# Patient Record
Sex: MALE | Race: WHITE | Employment: FULL TIME | URBAN - METROPOLITAN AREA
[De-identification: names, ages, dates, MRNs, and addresses within clinical notes are randomized per-mention and may not be internally consistent; named-entity substitution may affect disease eponyms.]

---

## 2017-04-01 LAB
CHOLESTEROL: 246 MG/DL
GLUCOSE BLD-MCNC: 100 MG/DL (ref 70–140)
HDLC SERPL-MCNC: 50 MG/DL
LDL CHOLESTEROL CALCULATED: 168 MG/DL
PATIENT FASTING?: YES
TRIGL SERPL-MCNC: 138 MG/DL

## 2017-04-04 ENCOUNTER — EMPLOYEE WELLNESS (OUTPATIENT)
Dept: OTHER | Age: 42
End: 2017-04-04

## 2018-03-16 ENCOUNTER — EMPLOYEE WELLNESS (OUTPATIENT)
Dept: OTHER | Age: 43
End: 2018-03-16

## 2018-03-16 LAB
CHOLESTEROL: 237 MG/DL
GLUCOSE BLD-MCNC: 85 MG/DL (ref 70–99)
HDLC SERPL-MCNC: 37 MG/DL
LDL CHOLESTEROL CALCULATED: 153 MG/DL
PATIENT FASTING?: YES
TRIGL SERPL-MCNC: 233 MG/DL

## 2018-03-20 VITALS — WEIGHT: 192 LBS

## 2018-04-16 VITALS — WEIGHT: 196 LBS

## 2019-03-13 ENCOUNTER — EMPLOYEE WELLNESS (OUTPATIENT)
Dept: OTHER | Age: 44
End: 2019-03-13

## 2019-03-13 LAB
CHOLESTEROL: 242 MG/DL
GLUCOSE BLD-MCNC: 80 MG/DL (ref 70–99)
HDLC SERPL-MCNC: 41 MG/DL
LDL CHOLESTEROL CALCULATED: 167 MG/DL
PATIENT FASTING?: YES
TRIGL SERPL-MCNC: 172 MG/DL

## 2019-03-20 VITALS — WEIGHT: 202 LBS

## 2019-09-24 ENCOUNTER — OFFICE VISIT (OUTPATIENT)
Dept: INTERNAL MEDICINE CLINIC | Age: 44
End: 2019-09-24
Payer: COMMERCIAL

## 2019-09-24 VITALS
HEART RATE: 83 BPM | HEIGHT: 70 IN | SYSTOLIC BLOOD PRESSURE: 141 MMHG | OXYGEN SATURATION: 96 % | DIASTOLIC BLOOD PRESSURE: 72 MMHG | BODY MASS INDEX: 30.01 KG/M2 | RESPIRATION RATE: 18 BRPM | WEIGHT: 209.6 LBS

## 2019-09-24 DIAGNOSIS — Z00.00 WELLNESS EXAMINATION: Primary | ICD-10-CM

## 2019-09-24 DIAGNOSIS — Z23 NEED FOR TDAP VACCINATION: ICD-10-CM

## 2019-09-24 DIAGNOSIS — Z00.00 WELLNESS EXAMINATION: ICD-10-CM

## 2019-09-24 DIAGNOSIS — J30.2 SEASONAL ALLERGIC RHINITIS, UNSPECIFIED TRIGGER: ICD-10-CM

## 2019-09-24 DIAGNOSIS — Z11.4 ENCOUNTER FOR SCREENING FOR HIV: ICD-10-CM

## 2019-09-24 DIAGNOSIS — R03.0 ELEVATED BLOOD PRESSURE READING: ICD-10-CM

## 2019-09-24 LAB
A/G RATIO: 1.8 (ref 1.1–2.2)
ALBUMIN SERPL-MCNC: 4.5 G/DL (ref 3.4–5)
ALP BLD-CCNC: 89 U/L (ref 40–129)
ALT SERPL-CCNC: 23 U/L (ref 10–40)
ANION GAP SERPL CALCULATED.3IONS-SCNC: 14 MMOL/L (ref 3–16)
AST SERPL-CCNC: 18 U/L (ref 15–37)
BASOPHILS ABSOLUTE: 0.1 K/UL (ref 0–0.2)
BASOPHILS RELATIVE PERCENT: 1.1 %
BILIRUB SERPL-MCNC: <0.2 MG/DL (ref 0–1)
BUN BLDV-MCNC: 12 MG/DL (ref 7–20)
CALCIUM SERPL-MCNC: 9.4 MG/DL (ref 8.3–10.6)
CHLORIDE BLD-SCNC: 106 MMOL/L (ref 99–110)
CHOLESTEROL, FASTING: 255 MG/DL (ref 0–199)
CO2: 23 MMOL/L (ref 21–32)
CREAT SERPL-MCNC: 0.7 MG/DL (ref 0.9–1.3)
EOSINOPHILS ABSOLUTE: 0.2 K/UL (ref 0–0.6)
EOSINOPHILS RELATIVE PERCENT: 3.9 %
GFR AFRICAN AMERICAN: >60
GFR NON-AFRICAN AMERICAN: >60
GLOBULIN: 2.5 G/DL
GLUCOSE BLD-MCNC: 90 MG/DL (ref 70–99)
HCT VFR BLD CALC: 45.1 % (ref 40.5–52.5)
HDLC SERPL-MCNC: 41 MG/DL (ref 40–60)
HEMOGLOBIN: 15.1 G/DL (ref 13.5–17.5)
LDL CHOLESTEROL CALCULATED: 170 MG/DL
LYMPHOCYTES ABSOLUTE: 1.8 K/UL (ref 1–5.1)
LYMPHOCYTES RELATIVE PERCENT: 31.9 %
MCH RBC QN AUTO: 31 PG (ref 26–34)
MCHC RBC AUTO-ENTMCNC: 33.5 G/DL (ref 31–36)
MCV RBC AUTO: 92.6 FL (ref 80–100)
MONOCYTES ABSOLUTE: 0.4 K/UL (ref 0–1.3)
MONOCYTES RELATIVE PERCENT: 7.6 %
NEUTROPHILS ABSOLUTE: 3.2 K/UL (ref 1.7–7.7)
NEUTROPHILS RELATIVE PERCENT: 55.5 %
PDW BLD-RTO: 13.4 % (ref 12.4–15.4)
PLATELET # BLD: 261 K/UL (ref 135–450)
PMV BLD AUTO: 8.5 FL (ref 5–10.5)
POTASSIUM SERPL-SCNC: 4.8 MMOL/L (ref 3.5–5.1)
RBC # BLD: 4.87 M/UL (ref 4.2–5.9)
SODIUM BLD-SCNC: 143 MMOL/L (ref 136–145)
TOTAL PROTEIN: 7 G/DL (ref 6.4–8.2)
TRIGLYCERIDE, FASTING: 221 MG/DL (ref 0–150)
TSH REFLEX: 2.53 UIU/ML (ref 0.27–4.2)
VLDLC SERPL CALC-MCNC: 44 MG/DL
WBC # BLD: 5.8 K/UL (ref 4–11)

## 2019-09-24 PROCEDURE — 90471 IMMUNIZATION ADMIN: CPT | Performed by: NURSE PRACTITIONER

## 2019-09-24 PROCEDURE — 90715 TDAP VACCINE 7 YRS/> IM: CPT | Performed by: NURSE PRACTITIONER

## 2019-09-24 PROCEDURE — 99396 PREV VISIT EST AGE 40-64: CPT | Performed by: NURSE PRACTITIONER

## 2019-09-24 RX ORDER — CETIRIZINE HYDROCHLORIDE 10 MG/1
10 TABLET ORAL DAILY
COMMUNITY

## 2019-09-24 ASSESSMENT — ENCOUNTER SYMPTOMS
SHORTNESS OF BREATH: 0
DIARRHEA: 0
WHEEZING: 0
ABDOMINAL DISTENTION: 0
ABDOMINAL PAIN: 0
SORE THROAT: 0
SINUS PRESSURE: 0
NAUSEA: 0
EYES NEGATIVE: 1
COLOR CHANGE: 0
CHEST TIGHTNESS: 0
COUGH: 0
SINUS PAIN: 0
CONSTIPATION: 0
ALLERGIC/IMMUNOLOGIC NEGATIVE: 1

## 2019-09-24 ASSESSMENT — PATIENT HEALTH QUESTIONNAIRE - PHQ9
SUM OF ALL RESPONSES TO PHQ QUESTIONS 1-9: 0
SUM OF ALL RESPONSES TO PHQ9 QUESTIONS 1 & 2: 0
2. FEELING DOWN, DEPRESSED OR HOPELESS: 0
1. LITTLE INTEREST OR PLEASURE IN DOING THINGS: 0
DEPRESSION UNABLE TO ASSESS: FUNCTIONAL CAPACITY MOTIVATION LIMITS ACCURACY
SUM OF ALL RESPONSES TO PHQ QUESTIONS 1-9: 0

## 2019-09-24 NOTE — PROGRESS NOTES
hematuria. Musculoskeletal: Negative for arthralgias, gait problem, joint swelling and myalgias. Skin: Negative for color change and rash. Allergic/Immunologic: Negative. Neurological: Negative for dizziness, light-headedness and numbness. Hematological: Negative. Psychiatric/Behavioral: Negative. Prior to Visit Medications    Medication Sig Taking? Authorizing Provider   cetirizine (ZYRTEC) 10 MG tablet Take 10 mg by mouth daily Yes Historical Provider, MD        No Known Allergies    History reviewed. No pertinent past medical history. History reviewed. No pertinent surgical history. Social History     Socioeconomic History    Marital status:      Spouse name: Not on file    Number of children: Not on file    Years of education: Not on file    Highest education level: Not on file   Occupational History    Not on file   Social Needs    Financial resource strain: Not on file    Food insecurity:     Worry: Not on file     Inability: Not on file    Transportation needs:     Medical: Not on file     Non-medical: Not on file   Tobacco Use    Smoking status: Former Smoker    Smokeless tobacco: Current User     Types: Snuff   Substance and Sexual Activity    Alcohol use:  Yes    Drug use: Never    Sexual activity: Never   Lifestyle    Physical activity:     Days per week: Not on file     Minutes per session: Not on file    Stress: Not on file   Relationships    Social connections:     Talks on phone: Not on file     Gets together: Not on file     Attends Religion service: Not on file     Active member of club or organization: Not on file     Attends meetings of clubs or organizations: Not on file     Relationship status: Not on file    Intimate partner violence:     Fear of current or ex partner: Not on file     Emotionally abused: Not on file     Physically abused: Not on file     Forced sexual activity: Not on file   Other Topics Concern    Not on file   Social History

## 2019-09-25 PROBLEM — E78.00 ELEVATED LDL CHOLESTEROL LEVEL: Status: ACTIVE | Noted: 2019-09-25

## 2019-09-25 LAB
HIV AG/AB: NORMAL
HIV ANTIGEN: NORMAL
HIV-1 ANTIBODY: NORMAL
HIV-2 AB: NORMAL

## 2019-10-15 ENCOUNTER — TELEPHONE (OUTPATIENT)
Dept: INTERNAL MEDICINE CLINIC | Age: 44
End: 2019-10-15

## 2020-03-26 ENCOUNTER — OFFICE VISIT (OUTPATIENT)
Dept: INTERNAL MEDICINE CLINIC | Age: 45
End: 2020-03-26
Payer: COMMERCIAL

## 2020-03-26 VITALS
SYSTOLIC BLOOD PRESSURE: 126 MMHG | OXYGEN SATURATION: 96 % | DIASTOLIC BLOOD PRESSURE: 86 MMHG | BODY MASS INDEX: 27.98 KG/M2 | WEIGHT: 195 LBS | RESPIRATION RATE: 18 BRPM | HEART RATE: 68 BPM

## 2020-03-26 LAB
A/G RATIO: 1.9 (ref 1.1–2.2)
ALBUMIN SERPL-MCNC: 4.7 G/DL (ref 3.4–5)
ALP BLD-CCNC: 89 U/L (ref 40–129)
ALT SERPL-CCNC: 28 U/L (ref 10–40)
ANION GAP SERPL CALCULATED.3IONS-SCNC: 12 MMOL/L (ref 3–16)
AST SERPL-CCNC: 25 U/L (ref 15–37)
BILIRUB SERPL-MCNC: <0.2 MG/DL (ref 0–1)
BUN BLDV-MCNC: 13 MG/DL (ref 7–20)
CALCIUM SERPL-MCNC: 9.8 MG/DL (ref 8.3–10.6)
CHLORIDE BLD-SCNC: 106 MMOL/L (ref 99–110)
CHOLESTEROL, FASTING: 257 MG/DL (ref 0–199)
CO2: 26 MMOL/L (ref 21–32)
CREAT SERPL-MCNC: 0.8 MG/DL (ref 0.9–1.3)
GFR AFRICAN AMERICAN: >60
GFR NON-AFRICAN AMERICAN: >60
GLOBULIN: 2.5 G/DL
GLUCOSE BLD-MCNC: 96 MG/DL (ref 70–99)
HDLC SERPL-MCNC: 46 MG/DL (ref 40–60)
LDL CHOLESTEROL CALCULATED: 172 MG/DL
POTASSIUM SERPL-SCNC: 5.3 MMOL/L (ref 3.5–5.1)
SODIUM BLD-SCNC: 144 MMOL/L (ref 136–145)
TOTAL PROTEIN: 7.2 G/DL (ref 6.4–8.2)
TRIGLYCERIDE, FASTING: 193 MG/DL (ref 0–150)
VLDLC SERPL CALC-MCNC: 39 MG/DL

## 2020-03-26 PROCEDURE — 99396 PREV VISIT EST AGE 40-64: CPT | Performed by: NURSE PRACTITIONER

## 2020-03-26 PROCEDURE — 36415 COLL VENOUS BLD VENIPUNCTURE: CPT | Performed by: NURSE PRACTITIONER

## 2020-03-26 ASSESSMENT — ENCOUNTER SYMPTOMS
SINUS PRESSURE: 0
ABDOMINAL DISTENTION: 0
SORE THROAT: 0
WHEEZING: 0
SHORTNESS OF BREATH: 0
EYES NEGATIVE: 1
CONSTIPATION: 0
COUGH: 0
CHEST TIGHTNESS: 0
COLOR CHANGE: 0
SINUS PAIN: 0
DIARRHEA: 0
NAUSEA: 0
ABDOMINAL PAIN: 0

## 2020-03-26 ASSESSMENT — PATIENT HEALTH QUESTIONNAIRE - PHQ9
SUM OF ALL RESPONSES TO PHQ QUESTIONS 1-9: 0
1. LITTLE INTEREST OR PLEASURE IN DOING THINGS: 0
2. FEELING DOWN, DEPRESSED OR HOPELESS: 0
SUM OF ALL RESPONSES TO PHQ9 QUESTIONS 1 & 2: 0
SUM OF ALL RESPONSES TO PHQ QUESTIONS 1-9: 0

## 2020-03-26 NOTE — PROGRESS NOTES
3/26/2020    Damaris Bustillo (:  1975) is a 40 y.o. male, here for a preventive medicine evaluation. Patient Active Problem List   Diagnosis    Seasonal allergic rhinitis    Elevated LDL cholesterol level     Overall, the patient denies any specific concerns for his health since his last 6 month follow up. His BP was moderately elevated last OV, however, he and his wife have been exercising routinely and working on diet improvements, limiting sodium intake. His wife is a nurse and has been checking his BP routinely and has been averaging around 120-130/80. Denies chest pain, dyspnea, edema, palpiations. His LDL was 170 6 months ago and we will need to recheck this today. He continues to use daily OTC Zyrtec with good control of his allergic rhinitis. Denies any recent exacerbations or concerns on current therapy. The 10-year ASCVD risk score (Aga Carter, et al., 2013) is: 3.5%    Values used to calculate the score:      Age: 40 years      Sex: Male      Is Non- : No      Diabetic: No      Tobacco smoker: No      Systolic Blood Pressure: 532 mmHg      Is BP treated: No      HDL Cholesterol: 41 mg/dL      Total Cholesterol: 255 mg/dL      Review of Systems   Constitutional: Negative for activity change, appetite change, fatigue and fever. HENT: Negative for congestion, sinus pressure, sinus pain and sore throat. Eyes: Negative. Respiratory: Negative for cough, chest tightness, shortness of breath and wheezing. Cardiovascular: Negative for chest pain and palpitations. Gastrointestinal: Negative for abdominal distention, abdominal pain, constipation, diarrhea and nausea. Genitourinary: Negative for difficulty urinating, dysuria, flank pain, frequency and hematuria. Musculoskeletal: Negative for arthralgias, gait problem, joint swelling and myalgias. Skin: Negative for color change and rash.    Neurological: Negative for dizziness, light-headedness and 195 lb (88.5 kg)     Estimated body mass index is 27.98 kg/m² as calculated from the following:    Height as of 9/24/19: 5' 10\" (1.778 m). Weight as of this encounter: 195 lb (88.5 kg). Physical Exam  Constitutional:       General: He is not in acute distress. Appearance: He is well-developed. He is not diaphoretic. HENT:      Head: Normocephalic and atraumatic. Nose: Nose normal.   Eyes:      Conjunctiva/sclera: Conjunctivae normal.      Pupils: Pupils are equal, round, and reactive to light. Neck:      Thyroid: No thyromegaly. Cardiovascular:      Rate and Rhythm: Normal rate and regular rhythm. Heart sounds: Normal heart sounds. No murmur. Pulmonary:      Effort: Pulmonary effort is normal.      Breath sounds: Normal breath sounds. Abdominal:      General: Bowel sounds are normal. There is no distension. Palpations: Abdomen is soft. Tenderness: There is no abdominal tenderness. Musculoskeletal: Normal range of motion. Lymphadenopathy:      Cervical: No cervical adenopathy. Skin:     General: Skin is warm and dry. Capillary Refill: Capillary refill takes less than 2 seconds. Findings: No rash. Neurological:      Mental Status: He is alert and oriented to person, place, and time. GCS: GCS eye subscore is 4. GCS verbal subscore is 5. GCS motor subscore is 6. Cranial Nerves: No cranial nerve deficit. Sensory: No sensory deficit. Coordination: Coordination normal.      Deep Tendon Reflexes: Reflexes normal.   Psychiatric:         Behavior: Behavior normal.         Thought Content: Thought content normal.         No flowsheet data found.     Lab Results   Component Value Date    CHOL 242 03/13/2019    CHOL 237 03/16/2018    CHOL 246 04/01/2017    CHOLFAST 255 09/24/2019    TRIG 172 03/13/2019    TRIG 233 03/16/2018    TRIG 138 04/01/2017    TRIGLYCFAST 221 09/24/2019    HDL 41 09/24/2019    HDL 41 03/13/2019    HDL 37 03/16/2018    LDLCALC 170 09/24/2019    LDLCALC 167 03/13/2019    [address] 153 03/16/2018    GLUCOSE 90 09/24/2019       The 10-year ASCVD risk score (Talha Leo., et al., 2013) is: 3.5%    Values used to calculate the score:      Age: 40 years      Sex: Male      Is Non- : No      Diabetic: No      Tobacco smoker: No      Systolic Blood Pressure: 643 mmHg      Is BP treated: No      HDL Cholesterol: 41 mg/dL      Total Cholesterol: 255 mg/dL    Immunization History   Administered Date(s) Administered    Tdap (Boostrix, Adacel) 09/24/2019       Health Maintenance   Topic Date Due    Lipid screen  09/24/2024    DTaP/Tdap/Td vaccine (2 - Td) 09/24/2029    Flu vaccine  Completed    HIV screen  Completed    Hepatitis A vaccine  Aged Out    Hepatitis B vaccine  Aged Out    Hib vaccine  Aged Out    Meningococcal (ACWY) vaccine  Aged Out    Pneumococcal 0-64 years Vaccine  Aged Out       ASSESSMENT/PLAN:  1. Encounter for preventive health examination- no acute concerns on exam today; BP is much improved. He has occasional elevated readings at home, but his average is at goal. Continue low sodium diet and routine exercise; monitor periodically at home and let us know if any issues. - Lipid, Fasting; Future  - Comprehensive Metabolic Panel; Future    2. Elevated LDL cholesterol level- recheck lipid panel today; if not improving, may need to consider statin. - Lipid, Fasting; Future  - Comprehensive Metabolic Panel; Future    3. Seasonal allergic rhinitis, unspecified trigger- stable; continue Zyrtec. No changes. Course of treatment, including any medications, possible imaging, referrals, and follow ups discussed with patient. All risks and benefits and possible side effects discussed with patient who agrees to plan of care and verbalizes understanding. All labs and imaging reviewed. Return in about 1 year (around 3/26/2021). An electronic signature was used to authenticate this note.     --Tawnya Bettencourt,

## 2020-03-27 RX ORDER — PRAVASTATIN SODIUM 20 MG
20 TABLET ORAL DAILY
Qty: 30 TABLET | Refills: 2 | Status: SHIPPED | OUTPATIENT
Start: 2020-03-27 | End: 2020-05-18

## 2020-05-18 RX ORDER — PRAVASTATIN SODIUM 20 MG
20 TABLET ORAL DAILY
Qty: 30 TABLET | Refills: 2 | Status: SHIPPED | OUTPATIENT
Start: 2020-05-18 | End: 2020-06-22 | Stop reason: SDUPTHER

## 2020-06-22 RX ORDER — PRAVASTATIN SODIUM 20 MG
20 TABLET ORAL DAILY
Qty: 30 TABLET | Refills: 2 | Status: SHIPPED | OUTPATIENT
Start: 2020-06-22 | End: 2020-09-04

## 2020-06-26 LAB
CHOLESTEROL, FASTING: 220 MG/DL (ref 0–199)
HDLC SERPL-MCNC: 49 MG/DL (ref 40–60)
LDL CHOLESTEROL CALCULATED: 144 MG/DL
TRIGLYCERIDE, FASTING: 134 MG/DL (ref 0–150)
VLDLC SERPL CALC-MCNC: 27 MG/DL

## 2020-06-26 PROCEDURE — 36415 COLL VENOUS BLD VENIPUNCTURE: CPT | Performed by: NURSE PRACTITIONER

## 2021-01-04 ENCOUNTER — HOSPITAL ENCOUNTER (OUTPATIENT)
Age: 46
Setting detail: SPECIMEN
Discharge: HOME | End: 2021-01-04
Payer: COMMERCIAL

## 2021-01-04 ENCOUNTER — OFFICE VISIT (OUTPATIENT)
Dept: PRIMARY CARE CLINIC | Age: 46
End: 2021-01-04
Payer: COMMERCIAL

## 2021-01-04 VITALS — OXYGEN SATURATION: 94 % | TEMPERATURE: 97.3 F | HEART RATE: 91 BPM

## 2021-01-04 DIAGNOSIS — Z20.822 CLOSE EXPOSURE TO COVID-19 VIRUS: ICD-10-CM

## 2021-01-04 DIAGNOSIS — R05.9 COUGH: Primary | ICD-10-CM

## 2021-01-04 DIAGNOSIS — M79.10 MUSCLE ACHE: ICD-10-CM

## 2021-01-04 PROCEDURE — 99213 OFFICE O/P EST LOW 20 MIN: CPT | Performed by: NURSE PRACTITIONER

## 2021-01-04 PROCEDURE — U0002 COVID-19 LAB TEST NON-CDC: HCPCS

## 2021-01-04 NOTE — PROGRESS NOTES
1/4/21  Og Alfred  1975    FLU/COVID-19 CLINIC EVALUATION    HPI SYMPTOMS: Pt's daughter/family (+) Covid-19. Employer: [employer]    [] Fevers  [x] Chills  [x] Cough  [] Coughing up blood  [] Chest Congestion  [x] Nasal Congestion--HEAD CONGESTION  [] Feeling short of breath  [] Sometimes  [] Frequently  [] All the time  [] Chest pain  [] Headaches  []Tolerable  [] Severe  [] Sore throat  [x] Muscle aches-- ACHY JOINTS  [] Nausea  [] Vomiting  []Unable to keep fluids down  [] Diarrhea  []Severe    [] OTHER SYMPTOMS:      Symptom Duration:   [] 1  [] 2   [x] 3   [] 4    [] 5   [] 6   [] 7   [] 8   [] 9   [] 10   [] 11   [] 12   [] 13   [] 14   [] Longer than 14 days    Symptom course:   [] Worsening     [x] Stable     [] Improving    RISK FACTORS:    [] Pregnant or possibly pregnant  [] Age over 61  [] Diabetes  [] Heart disease  [] Asthma  [] COPD/Other chronic lung diseases  [] Active Cancer  [] On Chemotherapy  [] Taking oral steroids  [] History Lymphoma/Leukemia  [x] Close contact with a lab confirmed COVID-19 patient within 14 days of symptom onset  [] History of travel from affected geographical areas within 14 days of symptom onset       VITALS:  There were no vitals filed for this visit. TESTS:    POCT FLU:  [] Positive     []Negative    ASSESSMENT:    [] Flu  [] Possible COVID-19  [] Strep    PLAN:    [] Discharge home with written instructions for:  [] Flu management  [] Possible COVID-19 infection with self-quarantine and management of symptoms  [] Follow-up with primary care physician or emergency department if worsens  [] Evaluation per physician/NP/PA in clinic  [] Sent to ER       An  electronic signature was used to authenticate this note.      --Jessie Diallo MA on 1/4/2021 at 2:27 PM

## 2021-01-04 NOTE — PATIENT INSTRUCTIONS
Your COVID 19 test can take 3-5 days for the results come back. We ask that you make a Mychart page and view your test results this way. You will need to Self quarantine until you know your results. Increase fluids rest  Saline nasal spray as directed  Warm salt gargles for throat discomfort  Monitor temperature twice a day  Tylenol for fevers and/or discomfort. If symptoms are worse -Go to the ER. Follow up with your primary doctor    To Whom it May Concern:    Mathew Arora has been tested for COVID on 01/04/21. They may NOT return to work until their lab test results back and they been fever free for 3 days. If test is positive they must stay home for 2 weeks or until they test negative or as directed by the [facility] Department.

## 2021-01-04 NOTE — PROGRESS NOTES
1/4/2021    HPI:  Chief complaint and history of present illness as per medical assistant/nurse documented today in the Flu/COVID-19 clinic. MEDICATIONS:  Prior to Visit Medications    Medication Sig Taking? Authorizing Provider   pravastatin (PRAVACHOL) 20 MG tablet TAKE 1 TABLET BY MOUTH DAILY  ARACELIS Potts CNP   cetirizine (ZYRTEC) 10 MG tablet Take 10 mg by mouth daily  Historical Provider, MD       No Known Allergies, No past medical history on file., No past surgical history on file.,   Social History     Tobacco Use    Smoking status: Former Smoker    Smokeless tobacco: Current User     Types: Snuff   Substance Use Topics    Alcohol use: Yes    Drug use: Never   ,   Family History   Problem Relation Age of Onset    Hypertension Father     Diabetes Father     Heart Disease Father    ,   Immunization History   Administered Date(s) Administered    Tdap (Boostrix, Adacel) 09/24/2019   ,   Health Maintenance   Topic Date Due    Hepatitis C screen  1975    Flu vaccine (1) 09/01/2020    Lipid screen  06/26/2021    DTaP/Tdap/Td vaccine (2 - Td) 09/24/2029    HIV screen  Completed    Hepatitis A vaccine  Aged Out    Hepatitis B vaccine  Aged Out    Hib vaccine  Aged Out    Meningococcal (ACWY) vaccine  Aged Out    Pneumococcal 0-64 years Vaccine  Aged Out       PHYSICAL EXAM:  Physical Exam  Constitutional:       Appearance: Normal appearance. HENT:      Head: Normocephalic. Right Ear: Tympanic membrane, ear canal and external ear normal.      Left Ear: Tympanic membrane, ear canal and external ear normal.      Nose: Nose normal.      Mouth/Throat:      Lips: Pink. Mouth: Mucous membranes are moist.      Pharynx: Oropharynx is clear. Neck:      Musculoskeletal: Neck supple. Cardiovascular:      Rate and Rhythm: Normal rate and regular rhythm. Pulmonary:      Effort: Pulmonary effort is normal.      Breath sounds: Normal breath sounds.    Skin: General: Skin is warm and dry. Neurological:      Mental Status: He is alert and oriented to person, place, and time. Psychiatric:         Mood and Affect: Mood normal.         Behavior: Behavior normal.         ASSESSMENT/PLAN:  1. Cough  Your COVID 19 test can take 3-5 days for the results come back. We ask that you make a Mychart page and view your test results this way. You will need to Self quarantine until you know your results. Increase fluids rest  Saline nasal spray as directed  Warm salt gargles for throat discomfort  Monitor temperature twice a day  Tylenol for fevers and/or discomfort. If symptoms are worse -Go to the ER. Follow up with your primary doctor    To Whom it May Concern:    Og Alfred has been tested for COVID on 01/04/21. They may NOT return to work until their lab test results back and they been fever free for 3 days. If test is positive they must stay home for 2 weeks or until they test negative or as directed by the [facility] Department. - COVID-19 Ambulatory    2. Muscle ache    - COVID-19 Ambulatory    3. Close exposure to COVID-19 virus    - COVID-19 Ambulatory      FOLLOW-UP:  Return if symptoms worsen or fail to improve.     In addition to other information, the printed after visit summary provided to the patient includes:  [x] COVID-19 Self care instructions  [x] COVID-19 General patient information

## 2021-01-06 LAB
SARS-COV-2: DETECTED
SOURCE: ABNORMAL

## 2021-02-16 DIAGNOSIS — E78.00 ELEVATED LDL CHOLESTEROL LEVEL: ICD-10-CM

## 2021-02-16 RX ORDER — PRAVASTATIN SODIUM 20 MG
20 TABLET ORAL DAILY
Qty: 30 TABLET | Refills: 5 | Status: SHIPPED | OUTPATIENT
Start: 2021-02-16 | End: 2021-07-30

## 2021-03-26 ENCOUNTER — OFFICE VISIT (OUTPATIENT)
Dept: INTERNAL MEDICINE CLINIC | Age: 46
End: 2021-03-26
Payer: COMMERCIAL

## 2021-03-26 VITALS
HEART RATE: 64 BPM | WEIGHT: 203 LBS | BODY MASS INDEX: 29.13 KG/M2 | DIASTOLIC BLOOD PRESSURE: 70 MMHG | RESPIRATION RATE: 18 BRPM | OXYGEN SATURATION: 97 % | SYSTOLIC BLOOD PRESSURE: 110 MMHG

## 2021-03-26 DIAGNOSIS — E78.00 ELEVATED LDL CHOLESTEROL LEVEL: ICD-10-CM

## 2021-03-26 DIAGNOSIS — Z00.00 ENCOUNTER FOR PREVENTIVE HEALTH EXAMINATION: Primary | ICD-10-CM

## 2021-03-26 DIAGNOSIS — Z00.00 ENCOUNTER FOR PREVENTIVE HEALTH EXAMINATION: ICD-10-CM

## 2021-03-26 DIAGNOSIS — J30.2 SEASONAL ALLERGIC RHINITIS, UNSPECIFIED TRIGGER: ICD-10-CM

## 2021-03-26 LAB
A/G RATIO: 2 (ref 1.1–2.2)
ALBUMIN SERPL-MCNC: 4.5 G/DL (ref 3.4–5)
ALP BLD-CCNC: 86 U/L (ref 40–129)
ALT SERPL-CCNC: 26 U/L (ref 10–40)
ANION GAP SERPL CALCULATED.3IONS-SCNC: 7 MMOL/L (ref 3–16)
AST SERPL-CCNC: 22 U/L (ref 15–37)
BASOPHILS ABSOLUTE: 0 K/UL (ref 0–0.2)
BASOPHILS RELATIVE PERCENT: 0.6 %
BILIRUB SERPL-MCNC: 0.3 MG/DL (ref 0–1)
BUN BLDV-MCNC: 13 MG/DL (ref 7–20)
CALCIUM SERPL-MCNC: 9.2 MG/DL (ref 8.3–10.6)
CHLORIDE BLD-SCNC: 106 MMOL/L (ref 99–110)
CHOLESTEROL, FASTING: 201 MG/DL (ref 0–199)
CO2: 27 MMOL/L (ref 21–32)
CREAT SERPL-MCNC: 0.7 MG/DL (ref 0.9–1.3)
EOSINOPHILS ABSOLUTE: 0.2 K/UL (ref 0–0.6)
EOSINOPHILS RELATIVE PERCENT: 5.5 %
GFR AFRICAN AMERICAN: >60
GFR NON-AFRICAN AMERICAN: >60
GLOBULIN: 2.3 G/DL
GLUCOSE BLD-MCNC: 91 MG/DL (ref 70–99)
HCT VFR BLD CALC: 44.7 % (ref 40.5–52.5)
HDLC SERPL-MCNC: 37 MG/DL (ref 40–60)
HEMOGLOBIN: 15.3 G/DL (ref 13.5–17.5)
LDL CHOLESTEROL CALCULATED: 139 MG/DL
LYMPHOCYTES ABSOLUTE: 1.6 K/UL (ref 1–5.1)
LYMPHOCYTES RELATIVE PERCENT: 39.8 %
MCH RBC QN AUTO: 30.9 PG (ref 26–34)
MCHC RBC AUTO-ENTMCNC: 34.1 G/DL (ref 31–36)
MCV RBC AUTO: 90.5 FL (ref 80–100)
MONOCYTES ABSOLUTE: 0.3 K/UL (ref 0–1.3)
MONOCYTES RELATIVE PERCENT: 8.3 %
NEUTROPHILS ABSOLUTE: 1.9 K/UL (ref 1.7–7.7)
NEUTROPHILS RELATIVE PERCENT: 45.8 %
PDW BLD-RTO: 13.6 % (ref 12.4–15.4)
PLATELET # BLD: 238 K/UL (ref 135–450)
PMV BLD AUTO: 8.1 FL (ref 5–10.5)
POTASSIUM SERPL-SCNC: 4.5 MMOL/L (ref 3.5–5.1)
RBC # BLD: 4.94 M/UL (ref 4.2–5.9)
SODIUM BLD-SCNC: 140 MMOL/L (ref 136–145)
TOTAL PROTEIN: 6.8 G/DL (ref 6.4–8.2)
TRIGLYCERIDE, FASTING: 124 MG/DL (ref 0–150)
TSH REFLEX: 2.05 UIU/ML (ref 0.27–4.2)
VLDLC SERPL CALC-MCNC: 25 MG/DL
WBC # BLD: 4.1 K/UL (ref 4–11)

## 2021-03-26 PROCEDURE — 99396 PREV VISIT EST AGE 40-64: CPT | Performed by: NURSE PRACTITIONER

## 2021-03-26 PROCEDURE — 36415 COLL VENOUS BLD VENIPUNCTURE: CPT | Performed by: NURSE PRACTITIONER

## 2021-03-26 ASSESSMENT — ENCOUNTER SYMPTOMS
COUGH: 0
NAUSEA: 0
SORE THROAT: 0
SINUS PAIN: 0
ABDOMINAL DISTENTION: 0
COLOR CHANGE: 0
WHEEZING: 0
EYES NEGATIVE: 1
DIARRHEA: 0
SINUS PRESSURE: 0
CONSTIPATION: 0
CHEST TIGHTNESS: 0
ABDOMINAL PAIN: 0
SHORTNESS OF BREATH: 0

## 2021-03-26 ASSESSMENT — PATIENT HEALTH QUESTIONNAIRE - PHQ9
SUM OF ALL RESPONSES TO PHQ QUESTIONS 1-9: 0
SUM OF ALL RESPONSES TO PHQ QUESTIONS 1-9: 0
SUM OF ALL RESPONSES TO PHQ9 QUESTIONS 1 & 2: 0
SUM OF ALL RESPONSES TO PHQ QUESTIONS 1-9: 0

## 2021-03-26 NOTE — PROGRESS NOTES
3/26/2021    Evelin Mckeon (:  1975) is a 39 y.o. male, here for a preventive medicine evaluation. Patient Active Problem List   Diagnosis    Seasonal allergic rhinitis    Elevated LDL cholesterol level     Patient denies any chest pain, shortness of breath, myalgias, Patient is tolerating cholesterol medications without difficulty. He has been tolerating the pravastatin without any diffuse leg cramps or other concerns for statin induced myalgia. His LDL did drop approximately 30 points while being on this medication in the last year. We will need to recheck his lipid panel again with today's lab work. Lab Results   Component Value Date     [address] (H) 2020     He continues to take daily over-the-counter Zyrtec for seasonal allergies which he recently started. This regimen is working effectively he denies any concerns at this time. Review of Systems   Constitutional: Negative for activity change, appetite change, fatigue and fever. HENT: Negative for congestion, sinus pressure, sinus pain and sore throat. Eyes: Negative. Respiratory: Negative for cough, chest tightness, shortness of breath and wheezing. Cardiovascular: Negative for chest pain and palpitations. Gastrointestinal: Negative for abdominal distention, abdominal pain, constipation, diarrhea and nausea. Genitourinary: Negative for difficulty urinating, dysuria, flank pain, frequency and hematuria. Musculoskeletal: Negative for arthralgias, gait problem, joint swelling and myalgias. Skin: Negative for color change and rash. Neurological: Negative for dizziness, light-headedness and numbness. Hematological: Negative. Psychiatric/Behavioral: Negative. Prior to Visit Medications    Medication Sig Taking?  Authorizing Provider   pravastatin (PRAVACHOL) 20 MG tablet TAKE 1 TABLET BY MOUTH DAILY Yes Cameron Siegel, APRN - CNP   cetirizine (ZYRTEC) 10 MG tablet Take 10 mg by mouth daily Yes Historical Provider, MD        No Known Allergies    History reviewed. No pertinent past medical history. History reviewed. No pertinent surgical history. Social History     Socioeconomic History    Marital status:      Spouse name: Not on file    Number of children: Not on file    Years of education: Not on file    Highest education level: Not on file   Occupational History    Not on file   Social Needs    Financial resource strain: Not on file    Food insecurity     Worry: Not on file     Inability: Not on file    Transportation needs     Medical: Not on file     Non-medical: Not on file   Tobacco Use    Smoking status: Former Smoker    Smokeless tobacco: Current User     Types: Snuff   Substance and Sexual Activity    Alcohol use: Yes    Drug use: Never    Sexual activity: Never   Lifestyle    Physical activity     Days per week: Not on file     Minutes per session: Not on file    Stress: Not on file   Relationships    Social connections     Talks on phone: Not on file     Gets together: Not on file     Attends Nondenominational service: Not on file     Active member of club or organization: Not on file     Attends meetings of clubs or organizations: Not on file     Relationship status: Not on file    Intimate partner violence     Fear of current or ex partner: Not on file     Emotionally abused: Not on file     Physically abused: Not on file     Forced sexual activity: Not on file   Other Topics Concern    Not on file   Social History Narrative    Not on file        Family History   Problem Relation Age of Onset    Hypertension Father     Diabetes Father     Heart Disease Father        ADVANCE DIRECTIVE: N, <no information>    Vitals:    03/26/21 0805   BP: 110/70   Pulse: 64   Resp: 18   SpO2: 97%   Weight: 203 lb (92.1 kg)     Estimated body mass index is 29.13 kg/m² as calculated from the following:    Height as of 9/24/19: 5' 10\" (1.778 m).     Weight as of this encounter: 203 lb (92.1 kg). Physical Exam  Constitutional:       General: He is not in acute distress. Appearance: He is well-developed. He is not diaphoretic. HENT:      Head: Normocephalic and atraumatic. Nose: Nose normal.   Eyes:      Conjunctiva/sclera: Conjunctivae normal.      Pupils: Pupils are equal, round, and reactive to light. Neck:      Thyroid: No thyromegaly. Cardiovascular:      Rate and Rhythm: Normal rate and regular rhythm. Heart sounds: Normal heart sounds. No murmur. Pulmonary:      Effort: Pulmonary effort is normal.      Breath sounds: Normal breath sounds. Abdominal:      General: Bowel sounds are normal. There is no distension. Palpations: Abdomen is soft. Tenderness: There is no abdominal tenderness. Musculoskeletal: Normal range of motion. Lymphadenopathy:      Cervical: No cervical adenopathy. Skin:     General: Skin is warm and dry. Capillary Refill: Capillary refill takes less than 2 seconds. Findings: No rash. Neurological:      Mental Status: He is alert and oriented to person, place, and time. GCS: GCS eye subscore is 4. GCS verbal subscore is 5. GCS motor subscore is 6. Cranial Nerves: No cranial nerve deficit. Sensory: No sensory deficit. Coordination: Coordination normal.      Deep Tendon Reflexes: Reflexes normal.   Psychiatric:         Behavior: Behavior normal.         Thought Content: Thought content normal.         No flowsheet data found.     Lab Results   Component Value Date    CHOL 242 03/13/2019    CHOL 237 03/16/2018    CHOL 246 04/01/2017    CHOLFAST 220 06/26/2020    CHOLFAST 257 03/26/2020    CHOLFAST 255 09/24/2019    TRIG 172 03/13/2019    TRIG 233 03/16/2018    TRIG 138 04/01/2017    TRIGLYCFAST 134 06/26/2020    TRIGLYCFAST 193 03/26/2020    TRIGLYCFAST 221 09/24/2019    HDL 49 06/26/2020    HDL 46 03/26/2020    HDL 41 09/24/2019    LDLCALC 144 06/26/2020    LDLCALC 172 03/26/2020    LDLCALC 170 09/24/2019 GLUCOSE 96 03/26/2020       The 10-year ASCVD risk score (Ramu Arreaga et al., 2013) is: 1.9%    Values used to calculate the score:      Age: 39 years      Sex: Male      Is Non- : No      Diabetic: No      Tobacco smoker: No      Systolic Blood Pressure: 566 mmHg      Is BP treated: No      HDL Cholesterol: 49 mg/dL      Total Cholesterol: 220 mg/dL    Immunization History   Administered Date(s) Administered    Tdap (Boostrix, Adacel) 09/24/2019       Health Maintenance   Topic Date Due    Hepatitis C screen  Never done    COVID-19 Vaccine (1) Never done    Flu vaccine (1) 09/01/2020    Lipid screen  06/26/2021    DTaP/Tdap/Td vaccine (2 - Td) 09/24/2029    HIV screen  Completed    Hepatitis A vaccine  Aged Out    Hepatitis B vaccine  Aged Out    Hib vaccine  Aged Out    Meningococcal (ACWY) vaccine  Aged Out    Pneumococcal 0-64 years Vaccine  Aged Out       ASSESSMENT/PLAN:  1. Encounter for preventive health examination-no acute concerns on exam today blood pressure and all vitals are at goal.  We will check preventative labs as below and address any concerns. -     CBC Auto Differential; Future  -     Comprehensive Metabolic Panel; Future  -     Lipid, Fasting; Future  -     TSH with Reflex; Future    2. Elevated LDL cholesterol level diet and exercise measures discussed at length. Encourage routine exercise and limiting saturated fats in his diet. We will continue statin for now and recheck hepatic function and lipid panel with today's lab work. Any adjustments to be made once results available. -     Comprehensive Metabolic Panel; Future  -     Lipid, Fasting; Future    3. Seasonal allergic rhinitis, unspecified trigger-stable on current over-the-counter Zyrtec regiment. No changes made at this time. Course of treatment, including any medications, possible imaging, referrals, and follow ups discussed with patient.  All risks and benefits and possible side effects discussed with patient who agrees to plan of care and verbalizes understanding. All labs and imaging reviewed. Return in about 1 year (around 3/26/2022). An electronic signature was used to authenticate this note.     --ARACELIS Arrieta - CNP on 3/26/2021 at 10:30 AM

## 2021-07-30 DIAGNOSIS — E78.00 ELEVATED LDL CHOLESTEROL LEVEL: ICD-10-CM

## 2021-07-30 RX ORDER — PRAVASTATIN SODIUM 20 MG
TABLET ORAL
Qty: 30 TABLET | Refills: 5 | Status: SHIPPED | OUTPATIENT
Start: 2021-07-30 | End: 2022-01-12

## 2022-01-12 DIAGNOSIS — E78.00 ELEVATED LDL CHOLESTEROL LEVEL: ICD-10-CM

## 2022-01-12 RX ORDER — PRAVASTATIN SODIUM 20 MG
TABLET ORAL
Qty: 30 TABLET | Refills: 5 | Status: SHIPPED | OUTPATIENT
Start: 2022-01-12 | End: 2022-03-07 | Stop reason: SDUPTHER

## 2022-03-07 DIAGNOSIS — E78.00 ELEVATED LDL CHOLESTEROL LEVEL: ICD-10-CM

## 2022-03-07 RX ORDER — PRAVASTATIN SODIUM 20 MG
20 TABLET ORAL DAILY
Qty: 30 TABLET | Refills: 5 | Status: SHIPPED | OUTPATIENT
Start: 2022-03-07 | End: 2022-03-28 | Stop reason: SDUPTHER

## 2022-03-28 ENCOUNTER — OFFICE VISIT (OUTPATIENT)
Dept: INTERNAL MEDICINE CLINIC | Age: 47
End: 2022-03-28
Payer: COMMERCIAL

## 2022-03-28 VITALS
HEIGHT: 70 IN | WEIGHT: 204 LBS | BODY MASS INDEX: 29.2 KG/M2 | DIASTOLIC BLOOD PRESSURE: 88 MMHG | RESPIRATION RATE: 18 BRPM | HEART RATE: 63 BPM | SYSTOLIC BLOOD PRESSURE: 122 MMHG | OXYGEN SATURATION: 96 %

## 2022-03-28 DIAGNOSIS — E78.00 ELEVATED LDL CHOLESTEROL LEVEL: ICD-10-CM

## 2022-03-28 DIAGNOSIS — J30.2 SEASONAL ALLERGIC RHINITIS, UNSPECIFIED TRIGGER: ICD-10-CM

## 2022-03-28 DIAGNOSIS — Z00.00 ENCOUNTER FOR PREVENTIVE HEALTH EXAMINATION: Primary | ICD-10-CM

## 2022-03-28 DIAGNOSIS — Z00.00 ENCOUNTER FOR PREVENTIVE HEALTH EXAMINATION: ICD-10-CM

## 2022-03-28 DIAGNOSIS — Z12.11 ENCOUNTER FOR SCREENING FOR MALIGNANT NEOPLASM OF COLON: ICD-10-CM

## 2022-03-28 LAB
A/G RATIO: 1.9 (ref 1.1–2.2)
ALBUMIN SERPL-MCNC: 4.8 G/DL (ref 3.4–5)
ALP BLD-CCNC: 94 U/L (ref 40–129)
ALT SERPL-CCNC: 32 U/L (ref 10–40)
ANION GAP SERPL CALCULATED.3IONS-SCNC: 13 MMOL/L (ref 3–16)
AST SERPL-CCNC: 21 U/L (ref 15–37)
BASOPHILS ABSOLUTE: 0 K/UL (ref 0–0.2)
BASOPHILS RELATIVE PERCENT: 0.8 %
BILIRUB SERPL-MCNC: 0.3 MG/DL (ref 0–1)
BUN BLDV-MCNC: 13 MG/DL (ref 7–20)
CALCIUM SERPL-MCNC: 9.5 MG/DL (ref 8.3–10.6)
CHLORIDE BLD-SCNC: 103 MMOL/L (ref 99–110)
CHOLESTEROL, FASTING: 252 MG/DL (ref 0–199)
CO2: 22 MMOL/L (ref 21–32)
CREAT SERPL-MCNC: 0.7 MG/DL (ref 0.9–1.3)
EOSINOPHILS ABSOLUTE: 0.2 K/UL (ref 0–0.6)
EOSINOPHILS RELATIVE PERCENT: 4.4 %
GFR AFRICAN AMERICAN: >60
GFR NON-AFRICAN AMERICAN: >60
GLUCOSE BLD-MCNC: 92 MG/DL (ref 70–99)
HCT VFR BLD CALC: 46.2 % (ref 40.5–52.5)
HDLC SERPL-MCNC: 44 MG/DL (ref 40–60)
HEMOGLOBIN: 15.2 G/DL (ref 13.5–17.5)
LDL CHOLESTEROL CALCULATED: 162 MG/DL
LYMPHOCYTES ABSOLUTE: 1.7 K/UL (ref 1–5.1)
LYMPHOCYTES RELATIVE PERCENT: 32.9 %
MCH RBC QN AUTO: 30.1 PG (ref 26–34)
MCHC RBC AUTO-ENTMCNC: 33 G/DL (ref 31–36)
MCV RBC AUTO: 91.4 FL (ref 80–100)
MONOCYTES ABSOLUTE: 0.4 K/UL (ref 0–1.3)
MONOCYTES RELATIVE PERCENT: 7.6 %
NEUTROPHILS ABSOLUTE: 2.7 K/UL (ref 1.7–7.7)
NEUTROPHILS RELATIVE PERCENT: 54.3 %
PDW BLD-RTO: 13.9 % (ref 12.4–15.4)
PLATELET # BLD: 258 K/UL (ref 135–450)
PMV BLD AUTO: 8.4 FL (ref 5–10.5)
POTASSIUM SERPL-SCNC: 4.6 MMOL/L (ref 3.5–5.1)
RBC # BLD: 5.06 M/UL (ref 4.2–5.9)
SODIUM BLD-SCNC: 138 MMOL/L (ref 136–145)
TOTAL PROTEIN: 7.3 G/DL (ref 6.4–8.2)
TRIGLYCERIDE, FASTING: 232 MG/DL (ref 0–150)
TSH REFLEX: 3.26 UIU/ML (ref 0.27–4.2)
VLDLC SERPL CALC-MCNC: 46 MG/DL
WBC # BLD: 5 K/UL (ref 4–11)

## 2022-03-28 PROCEDURE — 99396 PREV VISIT EST AGE 40-64: CPT | Performed by: NURSE PRACTITIONER

## 2022-03-28 PROCEDURE — 36415 COLL VENOUS BLD VENIPUNCTURE: CPT | Performed by: NURSE PRACTITIONER

## 2022-03-28 RX ORDER — PRAVASTATIN SODIUM 20 MG
20 TABLET ORAL DAILY
Qty: 90 TABLET | Refills: 3 | Status: SHIPPED | OUTPATIENT
Start: 2022-03-28 | End: 2022-03-29 | Stop reason: ALTCHOICE

## 2022-03-28 SDOH — ECONOMIC STABILITY: FOOD INSECURITY: WITHIN THE PAST 12 MONTHS, THE FOOD YOU BOUGHT JUST DIDN'T LAST AND YOU DIDN'T HAVE MONEY TO GET MORE.: NEVER TRUE

## 2022-03-28 SDOH — ECONOMIC STABILITY: FOOD INSECURITY: WITHIN THE PAST 12 MONTHS, YOU WORRIED THAT YOUR FOOD WOULD RUN OUT BEFORE YOU GOT MONEY TO BUY MORE.: NEVER TRUE

## 2022-03-28 ASSESSMENT — ENCOUNTER SYMPTOMS
SHORTNESS OF BREATH: 0
WHEEZING: 0
SORE THROAT: 0
EYES NEGATIVE: 1
CHEST TIGHTNESS: 0
COUGH: 0
COLOR CHANGE: 0
ABDOMINAL DISTENTION: 0
NAUSEA: 0
SINUS PRESSURE: 0
SINUS PAIN: 0
DIARRHEA: 0
CONSTIPATION: 0
ABDOMINAL PAIN: 0

## 2022-03-28 ASSESSMENT — PATIENT HEALTH QUESTIONNAIRE - PHQ9
2. FEELING DOWN, DEPRESSED OR HOPELESS: 0
SUM OF ALL RESPONSES TO PHQ QUESTIONS 1-9: 0
SUM OF ALL RESPONSES TO PHQ QUESTIONS 1-9: 0
1. LITTLE INTEREST OR PLEASURE IN DOING THINGS: 0
DEPRESSION UNABLE TO ASSESS: FUNCTIONAL CAPACITY MOTIVATION LIMITS ACCURACY
SUM OF ALL RESPONSES TO PHQ QUESTIONS 1-9: 0
SUM OF ALL RESPONSES TO PHQ QUESTIONS 1-9: 0
SUM OF ALL RESPONSES TO PHQ9 QUESTIONS 1 & 2: 0

## 2022-03-28 ASSESSMENT — SOCIAL DETERMINANTS OF HEALTH (SDOH): HOW HARD IS IT FOR YOU TO PAY FOR THE VERY BASICS LIKE FOOD, HOUSING, MEDICAL CARE, AND HEATING?: NOT HARD AT ALL

## 2022-03-28 NOTE — PROGRESS NOTES
3/28/2022    Miguelito Gilbert (:  1975) is a 55 y.o. male, here for a preventive medicine evaluation. Patient Active Problem List   Diagnosis    Seasonal allergic rhinitis    Elevated LDL cholesterol level     Patient denies any chest pain, shortness of breath, myalgias, Patient is tolerating cholesterol medications without difficulty. He has been tolerating the pravastatin without any diffuse leg cramps or other concerns for statin induced myalgia. Is compliant with Pravastatin 20 mg daily. Lab Results   Component Value Date     LDLCALC 144 (H) 2020      He continues to take daily over-the-counter Zyrtec for seasonal allergies,This regimen is working w/o concern.     -due for colon cancer screening; agreeable to cologuard. Review of Systems   Constitutional: Negative for activity change, appetite change, fatigue and fever. HENT: Negative for congestion, sinus pressure, sinus pain and sore throat. Eyes: Negative. Respiratory: Negative for cough, chest tightness, shortness of breath and wheezing. Cardiovascular: Negative for chest pain and palpitations. Gastrointestinal: Negative for abdominal distention, abdominal pain, constipation, diarrhea and nausea. Genitourinary: Negative for difficulty urinating, dysuria, flank pain, frequency and hematuria. Musculoskeletal: Negative for arthralgias, gait problem, joint swelling and myalgias. Skin: Negative for color change and rash. Neurological: Negative for dizziness, light-headedness and numbness. Hematological: Negative. Psychiatric/Behavioral: Negative. Prior to Visit Medications    Medication Sig Taking? Authorizing Provider   pravastatin (PRAVACHOL) 20 MG tablet Take 1 tablet by mouth daily Yes Cathy Moritz, APRN - CNP   cetirizine (ZYRTEC) 10 MG tablet Take 10 mg by mouth daily Yes Historical Provider, MD        No Known Allergies    History reviewed. No pertinent past medical history.     History reviewed. No pertinent surgical history. Social History     Socioeconomic History    Marital status:      Spouse name: Not on file    Number of children: Not on file    Years of education: Not on file    Highest education level: Not on file   Occupational History    Not on file   Tobacco Use    Smoking status: Former Smoker    Smokeless tobacco: Current User     Types: Snuff   Vaping Use    Vaping Use: Never used   Substance and Sexual Activity    Alcohol use: Yes    Drug use: Never    Sexual activity: Never   Other Topics Concern    Not on file   Social History Narrative    Not on file     Social Determinants of Health     Financial Resource Strain: Low Risk     Difficulty of Paying Living Expenses: Not hard at all   Food Insecurity: No Food Insecurity    Worried About Running Out of Food in the Last Year: Never true    [address] in the Last Year: Never true   Transportation Needs:     Lack of Transportation (Medical): Not on file    Lack of Transportation (Non-Medical):  Not on file   Physical Activity:     Days of Exercise per Week: Not on file    Minutes of Exercise per Session: Not on file   Stress:     Feeling of Stress : Not on file   Social Connections:     Frequency of Communication with Friends and Family: Not on file    Frequency of Social Gatherings with Friends and Family: Not on file    Attends [employer] Services: Not on file    Active Member of [address] [place] [address] [employer] or Organizations: Not on file    Attends Club or Organization Meetings: Not on file    Marital Status: Not on file   Intimate Partner Violence:     Fear of Current or Ex-Partner: Not on file    Emotionally Abused: Not on file    Physically Abused: Not on file    Sexually Abused: Not on file   Housing Stability:     Unable to Pay for Housing in the Last Year: Not on file    Number of [place] in the Last Year: Not on file    Unstable Housing in the Last Year: Not on file        Family History   Problem Relation Age of Onset    Hypertension Father     Diabetes Father     Heart Disease Father        ADVANCE DIRECTIVE: N, <no information>    Vitals:    03/28/22 0803   BP: 122/88   Pulse: 63   Resp: 18   SpO2: 96%   Weight: 204 lb (92.5 kg)   Height: 5' 10\" (1.778 m)     Estimated body mass index is 29.27 kg/m² as calculated from the following:    Height as of this encounter: 5' 10\" (1.778 m). Weight as of this encounter: 204 lb (92.5 kg). Physical Exam  Constitutional:       General: He is not in acute distress. Appearance: He is well-developed. He is not diaphoretic. HENT:      Head: Normocephalic and atraumatic. Nose: Nose normal.   Eyes:      Conjunctiva/sclera: Conjunctivae normal.      Pupils: Pupils are equal, round, and reactive to light. Neck:      Thyroid: No thyromegaly. Cardiovascular:      Rate and Rhythm: Normal rate and regular rhythm. Heart sounds: Normal heart sounds. No murmur heard. Pulmonary:      Effort: Pulmonary effort is normal.      Breath sounds: Normal breath sounds. Abdominal:      General: Bowel sounds are normal. There is no distension. Palpations: Abdomen is soft. Tenderness: There is no abdominal tenderness. Musculoskeletal:         General: Normal range of motion. Lymphadenopathy:      Cervical: No cervical adenopathy. Skin:     General: Skin is warm and dry. Capillary Refill: Capillary refill takes less than 2 seconds. Findings: No rash. Neurological:      Mental Status: He is alert and oriented to person, place, and time. GCS: GCS eye subscore is 4. GCS verbal subscore is 5. GCS motor subscore is 6. Cranial Nerves: No cranial nerve deficit. Sensory: No sensory deficit. Coordination: Coordination normal.      Deep Tendon Reflexes: Reflexes normal.   Psychiatric:         Behavior: Behavior normal.         Thought Content: Thought content normal.         No flowsheet data found.     Lab Results Component Value Date    CHOL 242 03/13/2019    CHOL 237 03/16/2018    CHOL 246 04/01/2017    CHOLFAST 201 03/26/2021    CHOLFAST 220 06/26/2020    CHOLFAST 257 03/26/2020    TRIG 172 03/13/2019    TRIG 233 03/16/2018    TRIG 138 04/01/2017    TRIGLYCFAST 124 03/26/2021    TRIGLYCFAST 134 06/26/2020    TRIGLYCFAST 193 03/26/2020    HDL 37 03/26/2021    HDL 49 06/26/2020    HDL 46 03/26/2020    LDLCALC 139 03/26/2021    LDLCALC 144 06/26/2020    LDLCALC 172 03/26/2020    GLUCOSE 91 03/26/2021       The 10-year ASCVD risk score (Dianne Blas, et al., 2013) is: 3%    Values used to calculate the score:      Age: 55 years      Sex: Male      Is Non- : No      Diabetic: No      Tobacco smoker: No      Systolic Blood Pressure: 095 mmHg      Is BP treated: No      HDL Cholesterol: 37 mg/dL      Total Cholesterol: 201 mg/dL    Immunization History   Administered Date(s) Administered    COVID-19, J&J, PF, 0.5 mL 03/29/2021    Tdap (Boostrix, Adacel) 09/24/2019       Health Maintenance   Topic Date Due    Colorectal Cancer Screen  Never done    COVID-19 Vaccine (2 - Booster for Soompi series) 05/24/2021    Flu vaccine (1) 09/01/2021    Lipid screen  03/26/2022    Depression Screen  03/28/2023    DTaP/Tdap/Td vaccine (2 - Td or Tdap) 09/24/2029    HIV screen  Completed    Hepatitis A vaccine  Aged Out    Hepatitis B vaccine  Aged Out    Hib vaccine  Aged Out    Meningococcal (ACWY) vaccine  Aged Out    Pneumococcal 0-64 years Vaccine  Aged Out    Hepatitis C screen  Discontinued       Assessment & Plan   Encounter for preventive health examination - no acute concerns on exam; BP and all vitals at goal; check preventative labs as below and will address any concerns. -     CBC with Auto Differential; Future  -     Comprehensive Metabolic Panel; Future  -     Lipid, Fasting; Future  -     TSH with Reflex;  Future    Elevated LDL cholesterol level- compliant with statin; recheck lipid panel and hepatic function today. Refills provided. -     CBC with Auto Differential; Future  -     Comprehensive Metabolic Panel; Future  -     Lipid, Fasting; Future  -     pravastatin (PRAVACHOL) 20 MG tablet; Take 1 tablet by mouth daily, Disp-90 tablet, R-3This prescription was filled on 10/28/2021. Any refills authorized will be placed on file. Normal    Seasonal allergic rhinitis, unspecified trigger- controlled; continue OTC Zyrtec. Encounter for screening for malignant neoplasm of colon- cologuard ordered  -     Fecal DNA Colorectal cancer screening (Cologuard)    Course of treatment, including any medications, possible imaging, referrals, and follow ups discussed with patient. All risks and benefits and possible side effects discussed with patient who agrees to plan of care and verbalizes understanding. All labs and imaging reviewed. Return in about 1 year (around 3/28/2023).          --Albertina Shows, APRN - CNP

## 2022-03-29 RX ORDER — ATORVASTATIN CALCIUM 20 MG/1
20 TABLET, FILM COATED ORAL DAILY
Qty: 90 TABLET | Refills: 1 | Status: SHIPPED | OUTPATIENT
Start: 2022-03-29 | End: 2022-09-14

## 2022-04-15 LAB — NONINV COLON CA DNA+OCC BLD SCRN STL QL: NEGATIVE

## 2022-09-14 RX ORDER — ATORVASTATIN CALCIUM 20 MG/1
20 TABLET, FILM COATED ORAL DAILY
Qty: 90 TABLET | Refills: 1 | Status: SHIPPED | OUTPATIENT
Start: 2022-09-14

## 2023-01-30 ENCOUNTER — OFFICE VISIT (OUTPATIENT)
Dept: INTERNAL MEDICINE CLINIC | Age: 48
End: 2023-01-30
Payer: COMMERCIAL

## 2023-01-30 VITALS
OXYGEN SATURATION: 97 % | WEIGHT: 216.9 LBS | DIASTOLIC BLOOD PRESSURE: 82 MMHG | SYSTOLIC BLOOD PRESSURE: 128 MMHG | HEIGHT: 70 IN | HEART RATE: 92 BPM | RESPIRATION RATE: 16 BRPM | BODY MASS INDEX: 31.05 KG/M2

## 2023-01-30 DIAGNOSIS — K42.9 UMBILICAL HERNIA WITHOUT OBSTRUCTION AND WITHOUT GANGRENE: Primary | ICD-10-CM

## 2023-01-30 PROCEDURE — 99213 OFFICE O/P EST LOW 20 MIN: CPT | Performed by: NURSE PRACTITIONER

## 2023-01-30 ASSESSMENT — PATIENT HEALTH QUESTIONNAIRE - PHQ9
DEPRESSION UNABLE TO ASSESS: FUNCTIONAL CAPACITY MOTIVATION LIMITS ACCURACY
1. LITTLE INTEREST OR PLEASURE IN DOING THINGS: 0
SUM OF ALL RESPONSES TO PHQ QUESTIONS 1-9: 0
2. FEELING DOWN, DEPRESSED OR HOPELESS: 0
SUM OF ALL RESPONSES TO PHQ QUESTIONS 1-9: 0
SUM OF ALL RESPONSES TO PHQ9 QUESTIONS 1 & 2: 0

## 2023-01-30 ASSESSMENT — ENCOUNTER SYMPTOMS
COLOR CHANGE: 0
SINUS PRESSURE: 0
ABDOMINAL DISTENTION: 0
CHEST TIGHTNESS: 0
DIARRHEA: 0
WHEEZING: 0
SINUS PAIN: 0
SHORTNESS OF BREATH: 0
ABDOMINAL PAIN: 1
SORE THROAT: 0
EYES NEGATIVE: 1
COUGH: 0
CONSTIPATION: 0
NAUSEA: 0

## 2023-01-30 NOTE — PROGRESS NOTES
Roger Founds  1975 01/30/23    Chief Complaint   Patient presents with    Abdominal Pain     Pt reports that he has a spot on the top of his belly button that is petruding and causing pain sx started 2 months ago        SUBJECTIVE:      Madhu Slater presents to the office this afternoon stating that he has noticed a \"painful bulge\" just above his umbilicus over the last 1-2 months. It is only painful during lifting, bending, coughing, etc. Denies any BM issues, pain with BM, blood in stool/black stool, etc. Is able to reduce the area. Review of Systems   Constitutional:  Negative for activity change, appetite change, fatigue and fever. HENT:  Negative for congestion, sinus pressure, sinus pain and sore throat. Eyes: Negative. Respiratory:  Negative for cough, chest tightness, shortness of breath and wheezing. Cardiovascular:  Negative for chest pain and palpitations. Gastrointestinal:  Positive for abdominal pain. Negative for abdominal distention, constipation, diarrhea and nausea. Genitourinary:  Negative for difficulty urinating, dysuria, flank pain, frequency and hematuria. Musculoskeletal:  Negative for arthralgias, gait problem, joint swelling and myalgias. Skin:  Negative for color change and rash. Neurological:  Negative for dizziness, light-headedness and numbness. Hematological: Negative. Psychiatric/Behavioral: Negative. OBJECTIVE:    /82   Pulse 92   Resp 16   Ht 5' 10\" (1.778 m)   Wt 216 lb 14.4 oz (98.4 kg)   SpO2 97%   BMI 31.12 kg/m²     Physical Exam  Constitutional:       General: He is not in acute distress. Appearance: He is well-developed. He is not diaphoretic. HENT:      Head: Normocephalic and atraumatic. Neck:      Thyroid: No thyromegaly. Cardiovascular:      Rate and Rhythm: Normal rate and regular rhythm. Pulmonary:      Effort: Pulmonary effort is normal.      Breath sounds: Normal breath sounds.    Abdominal:      General: Bowel sounds are normal. There is no distension.      Palpations: Abdomen is soft.      Tenderness: There is no abdominal tenderness.      Hernia: A hernia is present. Hernia is present in the umbilical area.       Musculoskeletal:         General: Normal range of motion.   Lymphadenopathy:      Cervical: No cervical adenopathy.   Skin:     General: Skin is warm and dry.      Capillary Refill: Capillary refill takes less than 2 seconds.   Neurological:      Mental Status: He is alert and oriented to person, place, and time.      GCS: GCS eye subscore is 4. GCS verbal subscore is 5. GCS motor subscore is 6.      Coordination: Coordination normal.   Psychiatric:         Behavior: Behavior normal.         Thought Content: Thought content normal.       ASSESSMENT:    1. Umbilical hernia without obstruction and without gangrene        PLAN:    Arron was seen today for abdominal pain.    Diagnoses and all orders for this visit:    Umbilical hernia without obstruction and without gangrene- exam most c/w dx; Will obtain CT as noted below to eval further and then discuss planning moving forward. Anticipate surgical consult. Discussed red flag warning signs to observe for.   -     CT ABDOMEN PELVIS WO CONTRAST Additional Contrast? None; Future      No flowsheet data found.    Return if symptoms worsen or fail to improve.    Pleas note this reports has been produced speech recognition software and may contain errors related to that system including errors in grammar, punctuation, and spelling, as well as words and phrases that may be appropriate. If there are any questions or concerns please feel free to contact the dictating provider for clarification.

## 2023-03-02 ENCOUNTER — HOSPITAL ENCOUNTER (OUTPATIENT)
Dept: CT IMAGING | Age: 48
Discharge: HOME | End: 2023-03-02

## 2023-03-02 DIAGNOSIS — K42.9 UMBILICAL HERNIA WITHOUT OBSTRUCTION AND WITHOUT GANGRENE: ICD-10-CM

## 2023-03-02 PROCEDURE — 74176 CT ABD & PELVIS W/O CONTRAST: CPT

## 2023-03-09 RX ORDER — ATORVASTATIN CALCIUM 20 MG/1
20 TABLET, FILM COATED ORAL DAILY
Qty: 90 TABLET | Refills: 1 | Status: SHIPPED | OUTPATIENT
Start: 2023-03-09

## 2023-03-27 SDOH — ECONOMIC STABILITY: HOUSING INSECURITY
IN THE LAST 12 MONTHS, WAS THERE A TIME WHEN YOU DID NOT HAVE A STEADY PLACE TO SLEEP OR SLEPT IN A SHELTER (INCLUDING NOW)?: NO

## 2023-03-27 SDOH — ECONOMIC STABILITY: INCOME INSECURITY: HOW HARD IS IT FOR YOU TO PAY FOR THE VERY BASICS LIKE FOOD, HOUSING, MEDICAL CARE, AND HEATING?: NOT HARD AT ALL

## 2023-03-27 SDOH — ECONOMIC STABILITY: FOOD INSECURITY: WITHIN THE PAST 12 MONTHS, YOU WORRIED THAT YOUR FOOD WOULD RUN OUT BEFORE YOU GOT MONEY TO BUY MORE.: NEVER TRUE

## 2023-03-27 SDOH — ECONOMIC STABILITY: FOOD INSECURITY: WITHIN THE PAST 12 MONTHS, THE FOOD YOU BOUGHT JUST DIDN'T LAST AND YOU DIDN'T HAVE MONEY TO GET MORE.: NEVER TRUE

## 2023-03-27 SDOH — ECONOMIC STABILITY: TRANSPORTATION INSECURITY
IN THE PAST 12 MONTHS, HAS LACK OF TRANSPORTATION KEPT YOU FROM MEETINGS, WORK, OR FROM GETTING THINGS NEEDED FOR DAILY LIVING?: NO

## 2023-03-28 ENCOUNTER — OFFICE VISIT (OUTPATIENT)
Dept: INTERNAL MEDICINE CLINIC | Age: 48
End: 2023-03-28
Payer: COMMERCIAL

## 2023-03-28 VITALS
HEIGHT: 70 IN | BODY MASS INDEX: 30.43 KG/M2 | HEART RATE: 77 BPM | RESPIRATION RATE: 18 BRPM | WEIGHT: 212.6 LBS | OXYGEN SATURATION: 93 % | DIASTOLIC BLOOD PRESSURE: 84 MMHG | SYSTOLIC BLOOD PRESSURE: 122 MMHG

## 2023-03-28 DIAGNOSIS — Z00.00 ENCOUNTER FOR PREVENTIVE HEALTH EXAMINATION: Primary | ICD-10-CM

## 2023-03-28 DIAGNOSIS — J30.2 SEASONAL ALLERGIC RHINITIS, UNSPECIFIED TRIGGER: ICD-10-CM

## 2023-03-28 DIAGNOSIS — K42.9 UMBILICAL HERNIA WITHOUT OBSTRUCTION AND WITHOUT GANGRENE: ICD-10-CM

## 2023-03-28 DIAGNOSIS — Z00.00 ENCOUNTER FOR PREVENTIVE HEALTH EXAMINATION: ICD-10-CM

## 2023-03-28 DIAGNOSIS — E78.00 ELEVATED LDL CHOLESTEROL LEVEL: ICD-10-CM

## 2023-03-28 LAB
ALBUMIN SERPL-MCNC: 4.9 G/DL (ref 3.4–5)
ALBUMIN/GLOB SERPL: 1.7 {RATIO} (ref 1.1–2.2)
ALP SERPL-CCNC: 107 U/L (ref 40–129)
ALT SERPL-CCNC: 38 U/L (ref 10–40)
ANION GAP SERPL CALCULATED.3IONS-SCNC: 12 MMOL/L (ref 3–16)
AST SERPL-CCNC: 25 U/L (ref 15–37)
BASOPHILS # BLD: 0.1 K/UL (ref 0–0.2)
BASOPHILS NFR BLD: 1.2 %
BILIRUB SERPL-MCNC: 0.4 MG/DL (ref 0–1)
BUN SERPL-MCNC: 13 MG/DL (ref 7–20)
CALCIUM SERPL-MCNC: 10.3 MG/DL (ref 8.3–10.6)
CHLORIDE SERPL-SCNC: 104 MMOL/L (ref 99–110)
CO2 SERPL-SCNC: 23 MMOL/L (ref 21–32)
CREAT SERPL-MCNC: 0.7 MG/DL (ref 0.9–1.3)
DEPRECATED RDW RBC AUTO: 13.5 % (ref 12.4–15.4)
EOSINOPHIL # BLD: 0.2 K/UL (ref 0–0.6)
EOSINOPHIL NFR BLD: 4.3 %
GFR SERPLBLD CREATININE-BSD FMLA CKD-EPI: >60 ML/MIN/{1.73_M2}
GLUCOSE SERPL-MCNC: 92 MG/DL (ref 70–99)
HCT VFR BLD AUTO: 48.4 % (ref 40.5–52.5)
HGB BLD-MCNC: 16.3 G/DL (ref 13.5–17.5)
LYMPHOCYTES # BLD: 1.4 K/UL (ref 1–5.1)
LYMPHOCYTES NFR BLD: 28.6 %
MCH RBC QN AUTO: 30.3 PG (ref 26–34)
MCHC RBC AUTO-ENTMCNC: 33.7 G/DL (ref 31–36)
MCV RBC AUTO: 90.1 FL (ref 80–100)
MONOCYTES # BLD: 0.4 K/UL (ref 0–1.3)
MONOCYTES NFR BLD: 7.4 %
NEUTROPHILS # BLD: 3 K/UL (ref 1.7–7.7)
NEUTROPHILS NFR BLD: 58.5 %
PLATELET # BLD AUTO: 275 K/UL (ref 135–450)
PMV BLD AUTO: 8.5 FL (ref 5–10.5)
POTASSIUM SERPL-SCNC: 4.5 MMOL/L (ref 3.5–5.1)
PROT SERPL-MCNC: 7.8 G/DL (ref 6.4–8.2)
RBC # BLD AUTO: 5.38 M/UL (ref 4.2–5.9)
SODIUM SERPL-SCNC: 139 MMOL/L (ref 136–145)
WBC # BLD AUTO: 5.1 K/UL (ref 4–11)

## 2023-03-28 PROCEDURE — 36415 COLL VENOUS BLD VENIPUNCTURE: CPT | Performed by: NURSE PRACTITIONER

## 2023-03-28 PROCEDURE — 99396 PREV VISIT EST AGE 40-64: CPT | Performed by: NURSE PRACTITIONER

## 2023-03-28 RX ORDER — FLUTICASONE PROPIONATE 50 MCG
1 SPRAY, SUSPENSION (ML) NASAL DAILY
COMMUNITY

## 2023-03-28 SDOH — ECONOMIC STABILITY: INCOME INSECURITY: HOW HARD IS IT FOR YOU TO PAY FOR THE VERY BASICS LIKE FOOD, HOUSING, MEDICAL CARE, AND HEATING?: NOT HARD AT ALL

## 2023-03-28 SDOH — ECONOMIC STABILITY: FOOD INSECURITY: WITHIN THE PAST 12 MONTHS, THE FOOD YOU BOUGHT JUST DIDN'T LAST AND YOU DIDN'T HAVE MONEY TO GET MORE.: NEVER TRUE

## 2023-03-28 SDOH — ECONOMIC STABILITY: FOOD INSECURITY: WITHIN THE PAST 12 MONTHS, YOU WORRIED THAT YOUR FOOD WOULD RUN OUT BEFORE YOU GOT MONEY TO BUY MORE.: NEVER TRUE

## 2023-03-28 ASSESSMENT — ENCOUNTER SYMPTOMS
COUGH: 0
CONSTIPATION: 0
ABDOMINAL PAIN: 0
COLOR CHANGE: 0
SINUS PAIN: 0
NAUSEA: 0
ABDOMINAL DISTENTION: 0
SINUS PRESSURE: 0
SHORTNESS OF BREATH: 0
WHEEZING: 0
EYES NEGATIVE: 1
DIARRHEA: 0
CHEST TIGHTNESS: 0
SORE THROAT: 0

## 2023-03-28 NOTE — PROGRESS NOTES
referrals, and follow ups discussed with patient. All risks and benefits and possible side effects discussed with patient who agrees to plan of care and verbalizes understanding. All labs and imaging reviewed. Return in about 1 year (around 3/28/2024).          --ARACELIS Magana - CNP

## 2023-03-29 DIAGNOSIS — E78.00 ELEVATED LDL CHOLESTEROL LEVEL: ICD-10-CM

## 2023-03-29 DIAGNOSIS — E78.00 ELEVATED LDL CHOLESTEROL LEVEL: Primary | ICD-10-CM

## 2023-03-29 LAB
CHOLEST SERPL-MCNC: 187 MG/DL (ref 0–199)
EST. AVERAGE GLUCOSE BLD GHB EST-MCNC: 108.3 MG/DL
HBA1C MFR BLD: 5.4 %
HDLC SERPL-MCNC: 47 MG/DL (ref 40–60)
LDL CHOLESTEROL CALCULATED: 119 MG/DL
TRIGL SERPL-MCNC: 105 MG/DL (ref 0–150)
VLDLC SERPL CALC-MCNC: 21 MG/DL

## 2023-03-29 PROCEDURE — 36415 COLL VENOUS BLD VENIPUNCTURE: CPT | Performed by: NURSE PRACTITIONER

## 2023-08-22 ENCOUNTER — OFFICE VISIT (OUTPATIENT)
Dept: INTERNAL MEDICINE CLINIC | Age: 48
End: 2023-08-22
Payer: COMMERCIAL

## 2023-08-22 VITALS
SYSTOLIC BLOOD PRESSURE: 118 MMHG | WEIGHT: 188.8 LBS | RESPIRATION RATE: 20 BRPM | DIASTOLIC BLOOD PRESSURE: 78 MMHG | HEART RATE: 66 BPM | OXYGEN SATURATION: 96 % | BODY MASS INDEX: 27.03 KG/M2 | HEIGHT: 70 IN

## 2023-08-22 DIAGNOSIS — K42.9 UMBILICAL HERNIA WITHOUT OBSTRUCTION AND WITHOUT GANGRENE: Primary | ICD-10-CM

## 2023-08-22 PROCEDURE — 99213 OFFICE O/P EST LOW 20 MIN: CPT | Performed by: NURSE PRACTITIONER

## 2023-08-22 ASSESSMENT — ENCOUNTER SYMPTOMS
NAUSEA: 0
WHEEZING: 0
SHORTNESS OF BREATH: 0
EYES NEGATIVE: 1
SINUS PRESSURE: 0
DIARRHEA: 0
CHEST TIGHTNESS: 0
ABDOMINAL DISTENTION: 0
SORE THROAT: 0
SINUS PAIN: 0
CONSTIPATION: 0
COLOR CHANGE: 0
COUGH: 0
ABDOMINAL PAIN: 1

## 2023-08-22 NOTE — PROGRESS NOTES
is well-developed. He is not diaphoretic. HENT:      Head: Normocephalic and atraumatic. Nose: Nose normal.   Eyes:      Conjunctiva/sclera: Conjunctivae normal.      Pupils: Pupils are equal, round, and reactive to light. Neck:      Thyroid: No thyromegaly. Cardiovascular:      Rate and Rhythm: Normal rate and regular rhythm. Heart sounds: Normal heart sounds. No murmur heard. Pulmonary:      Effort: Pulmonary effort is normal.      Breath sounds: Normal breath sounds. Abdominal:      General: Bowel sounds are normal. There is no distension. Palpations: Abdomen is soft. Tenderness: There is no abdominal tenderness. Hernia: A hernia is present. Hernia is present in the umbilical area. Musculoskeletal:         General: Normal range of motion. Lymphadenopathy:      Cervical: No cervical adenopathy. Skin:     General: Skin is warm and dry. Capillary Refill: Capillary refill takes less than 2 seconds. Findings: No rash. Neurological:      Mental Status: He is alert and oriented to person, place, and time. GCS: GCS eye subscore is 4. GCS verbal subscore is 5. GCS motor subscore is 6. Cranial Nerves: No cranial nerve deficit. Sensory: No sensory deficit. Coordination: Coordination normal.      Deep Tendon Reflexes: Reflexes normal.   Psychiatric:         Behavior: Behavior normal.         Thought Content: Thought content normal.       ASSESSMENT:    1. Umbilical hernia without obstruction and without gangrene        PLAN:    Lamar Cabrera was seen today for abdominal pain. Diagnoses and all orders for this visit:    Umbilical hernia without obstruction and without gangrene- given progressive symptoms, will ask Gen Surg to review case and see pt in consultation. Referral sent today as noted below. - [id number] [facility] [address], General Surgery, [place]        No flowsheet data found. No follow-ups on file.     Pleas note this reports has

## 2023-08-30 NOTE — PROGRESS NOTES
Chief Complaint   Patient presents with    New Patient     NP- [facility] REF: Dr. Austin Pelaez:  HPI: Patient presents for evaluation of ventral hernia. Symptoms were first noted 1 year ago. Pain is intermittent, progressive, and worsening . Lump is reducible, and more frequently over the past 6-8 weeks pt has had to reduce the hernia manually more frequently. Pt denies N/V or bowel changes. Pt with no previous history of abdominal surgery. Pt with no hx of colonoscopy, but has had cologuard test. Which was negative. I have reviewed the patient's(pertinent information to this visit) medical history, family history(scanned in  theMedia tab under \"patient questioner\"), social history and review of systems with the patient today in the office. No past surgical history on file. No past medical history on file. Family History   Problem Relation Age of Onset    Hypertension Father     Diabetes Father     Heart Disease Father      Social History     Socioeconomic History    Marital status:      Spouse name: Not on file    Number of children: Not on file    Years of education: Not on file    Highest education level: Not on file   Occupational History    Not on file   Tobacco Use    Smoking status: Former    Smokeless tobacco: Current     Types: Snuff   Vaping Use    Vaping Use: Never used   Substance and Sexual Activity    Alcohol use: Yes    Drug use: Never    Sexual activity: Never   Other Topics Concern    Not on file   Social History Narrative    Not on file     Social Determinants of Health     Financial Resource Strain: Low Risk     Difficulty of Paying Living Expenses: Not hard at all   Food Insecurity: No Food Insecurity    Worried About Running Out of Food in the Last Year: Never true    [address] in the Last Year: Never true   Transportation Needs: Unknown    Lack of Transportation (Medical): Not on file    Lack of Transportation (Non-Medical):  No   Physical Activity: Not on

## 2023-08-31 ENCOUNTER — OFFICE VISIT (OUTPATIENT)
Dept: SURGERY | Age: 48
End: 2023-08-31
Payer: COMMERCIAL

## 2023-08-31 VITALS
DIASTOLIC BLOOD PRESSURE: 82 MMHG | HEIGHT: 70 IN | BODY MASS INDEX: 27.5 KG/M2 | HEART RATE: 75 BPM | WEIGHT: 192.1 LBS | OXYGEN SATURATION: 99 % | SYSTOLIC BLOOD PRESSURE: 128 MMHG

## 2023-08-31 DIAGNOSIS — K42.9 UMBILICAL HERNIA WITHOUT OBSTRUCTION AND WITHOUT GANGRENE: Primary | ICD-10-CM

## 2023-08-31 PROCEDURE — 99203 OFFICE O/P NEW LOW 30 MIN: CPT | Performed by: PHYSICIAN ASSISTANT

## 2023-08-31 ASSESSMENT — ENCOUNTER SYMPTOMS
CHOKING: 0
EYE REDNESS: 0
NAUSEA: 0
COLOR CHANGE: 0
STRIDOR: 0
SORE THROAT: 0
ABDOMINAL PAIN: 1
ANAL BLEEDING: 0
RECTAL PAIN: 0
EYE ITCHING: 0
CONSTIPATION: 0
PHOTOPHOBIA: 0
BACK PAIN: 0
APNEA: 0

## 2023-08-31 ASSESSMENT — PATIENT HEALTH QUESTIONNAIRE - PHQ9
1. LITTLE INTEREST OR PLEASURE IN DOING THINGS: 0
SUM OF ALL RESPONSES TO PHQ QUESTIONS 1-9: 0
2. FEELING DOWN, DEPRESSED OR HOPELESS: 0
SUM OF ALL RESPONSES TO PHQ9 QUESTIONS 1 & 2: 0
SUM OF ALL RESPONSES TO PHQ QUESTIONS 1-9: 0

## 2023-09-11 RX ORDER — ATORVASTATIN CALCIUM 20 MG/1
20 TABLET, FILM COATED ORAL DAILY
Qty: 90 TABLET | Refills: 1 | Status: SHIPPED | OUTPATIENT
Start: 2023-09-11

## 2023-09-29 ENCOUNTER — TELEPHONE (OUTPATIENT)
Dept: SURGERY | Age: 48
End: 2023-09-29

## 2023-09-29 NOTE — TELEPHONE ENCOUNTER
SPOKE TO Juan Russo REGARDING SURGERY (Open Umb Hernia Repair) SCHEDULED @ [facility].  NOTIFIED OF DATES, TIMES AND LOCATION    PAT - PHONE ASSESSMENT  SURGERY - 12/22/23 @ 0730, 0600 arrival  P/O -  1/4/24 at 1300 with Kristin HOLGUIN AFTER MIDNIGHT  HOLD BLOOD THINNERS - Not on thinners

## 2023-10-25 ENCOUNTER — TELEPHONE (OUTPATIENT)
Dept: SURGERY | Age: 48
End: 2023-10-25

## 2023-10-25 NOTE — TELEPHONE ENCOUNTER
Order Request Summary  Health Plan:  [facility]  Scheduled Date of Service:  12/22/2023      Order ID: [id number]       Authorized    Approval Valid Through: 10/25/2023 - 12/23/2023  This order is not a guarantee of payment except when required by applicable law. When applicable law allows, payment is subject to the member's active enrollment, benefit limitation and other terms of the member's contract at the time of services provided. Member Information:  Diana Perez  Member #: [id number]  [address] [facility] [address] [place] [id number]  YOB: 1975  Phone: [phone]  Ordering Provider:  Haroon Cruz  [address]  Phone: [phone]  Fax: [phone]  NPI: [id number]  Performing Provider:  Haroon Cruz  [address] [place] [address]  Phone: [phone]  Fax: [phone]  NPI: [id number]  Servicing Provider:     [facility]  [address] [facility] , [address] [facility] [place] [facility]  Phone: [phone]  Fax:  NPI: [id number]  TIN: [id number]        The information below was obtained from the Ordering Provider and has not been independently verified by [address] [place] [address] Benefits Management. Lindsey Roberts assumes no responsibility for the accuracy of this information or for its consistency with the patient's medical record. Please call [phone] for all Urgent Requests. REQUESTED PROCEDURE   PROCEDURE REQUEST STATUS REASON ACTION   Hernioplasty, Herniorrhaphy, Herniotomy - Initial hernia repair      Authorized  Criteria Met  Review Procedure Withdraw Procedure        Multiple Decisions Rendered = Multiple Decisions Rendered  The Order Number covers one of the following applicable codes when the outcome is Authorized or Completed.     CPT GROUP DETAILS  CPT GROUP CPT DESCRIPTION CPT GROUP DESCRIPTION  43909 RPR AA HRN 1ST < 3 CM RDC Hernioplasty, Herniorrhaphy, Herniotomy - Initial hernia

## 2023-12-19 NOTE — PROGRESS NOTES
12/19/23 - . LM with my call-back # concerning  surgery @ [facility] on  12/22/23. Please call the PAT Nurse for a phone assessment and surgery instructions.

## 2023-12-21 NOTE — H&P
General Surgery - H&P  Dr. Holland Duane, PADanyC      SUBJECTIVE:  HPI: Patient presents for evaluation of ventral hernia. Symptoms were first noted 1 year ago. Pain is intermittent, progressive, and worsening . Lump is reducible, and more frequently over the past 6-8 weeks pt has had to reduce the hernia manually more frequently. Pt denies N/V or bowel changes. Pt with no previous history of abdominal surgery. Pt with no hx of colonoscopy, but has had cologuard test. Which was negative. I have reviewed the patient's(pertinent information to this visit) medical history, family history(scanned in  theMedia tab under \"patient questioner\"), social history and review of systems with the patient in the office. Past Surgical History   No past surgical history on file. Past Medical History   No past medical history on file. Family History         Family History   Problem Relation Age of Onset    Hypertension Father      Diabetes Father      Heart Disease Father           Social History               Socioeconomic History    Marital status:        Spouse name: Not on file    Number of children: Not on file    Years of education: Not on file    Highest education level: Not on file   Occupational History    Not on file   Tobacco Use    Smoking status: Former    Smokeless tobacco: Current       Types: Snuff   Vaping Use    Vaping Use: Never used   Substance and Sexual Activity    Alcohol use: Yes    Drug use: Never    Sexual activity: Never   Other Topics Concern    Not on file   Social History Narrative    Not on file      Social Determinants of Health          Financial Resource Strain: Low Risk     Difficulty of Paying Living Expenses: Not hard at all   Food Insecurity: No Food Insecurity    Worried About Running Out of Food in the Last Year: Never true    [address] in the Last Year: Never true   Transportation Needs: Unknown    Lack of Transportation (Medical):  Not on file

## 2023-12-22 ENCOUNTER — HOSPITAL ENCOUNTER (OUTPATIENT)
Age: 48
Setting detail: OUTPATIENT SURGERY
Discharge: HOME | End: 2023-12-22
Attending: SURGERY | Admitting: SURGERY
Payer: COMMERCIAL

## 2023-12-22 VITALS
SYSTOLIC BLOOD PRESSURE: 106 MMHG | DIASTOLIC BLOOD PRESSURE: 65 MMHG | BODY MASS INDEX: 25.91 KG/M2 | OXYGEN SATURATION: 94 % | WEIGHT: 181 LBS | TEMPERATURE: 97.7 F | RESPIRATION RATE: 16 BRPM | HEIGHT: 70 IN | HEART RATE: 71 BPM

## 2023-12-22 DIAGNOSIS — K42.9 UMBILICAL HERNIA WITHOUT OBSTRUCTION AND WITHOUT GANGRENE: Primary | ICD-10-CM

## 2023-12-22 PROCEDURE — 3700000000 HC ANESTHESIA ATTENDED CARE: Performed by: SURGERY

## 2023-12-22 PROCEDURE — APPNB60 APP NON BILLABLE TIME 46-60 MINS: Performed by: PHYSICIAN ASSISTANT

## 2023-12-22 PROCEDURE — 7100000011 HC PHASE II RECOVERY - ADDTL 15 MIN: Performed by: SURGERY

## 2023-12-22 PROCEDURE — 3600000005 HC SURGERY LEVEL 5 BASE: Performed by: SURGERY

## 2023-12-22 PROCEDURE — 3700000001 HC ADD 15 MINUTES (ANESTHESIA): Performed by: SURGERY

## 2023-12-22 PROCEDURE — 7100000001 HC PACU RECOVERY - ADDTL 15 MIN: Performed by: SURGERY

## 2023-12-22 PROCEDURE — 3600000015 HC SURGERY LEVEL 5 ADDTL 15MIN: Performed by: SURGERY

## 2023-12-22 PROCEDURE — 6360000002 HC RX W HCPCS: Performed by: SURGERY

## 2023-12-22 PROCEDURE — 49591 RPR AA HRN 1ST < 3 CM RDC: CPT | Performed by: SURGERY

## 2023-12-22 PROCEDURE — 2709999900 HC NON-CHARGEABLE SUPPLY: Performed by: SURGERY

## 2023-12-22 PROCEDURE — 7100000000 HC PACU RECOVERY - FIRST 15 MIN: Performed by: SURGERY

## 2023-12-22 PROCEDURE — 49591 RPR AA HRN 1ST < 3 CM RDC: CPT | Performed by: PHYSICIAN ASSISTANT

## 2023-12-22 PROCEDURE — 7100000010 HC PHASE II RECOVERY - FIRST 15 MIN: Performed by: SURGERY

## 2023-12-22 RX ORDER — SODIUM CHLORIDE 0.9 % (FLUSH) 0.9 %
5-40 SYRINGE (ML) INJECTION PRN
Status: DISCONTINUED | OUTPATIENT
Start: 2023-12-22 | End: 2023-12-22 | Stop reason: HOSPADM

## 2023-12-22 RX ORDER — HYDRALAZINE HYDROCHLORIDE 20 MG/ML
10 INJECTION INTRAMUSCULAR; INTRAVENOUS
Status: DISCONTINUED | OUTPATIENT
Start: 2023-12-22 | End: 2023-12-22 | Stop reason: HOSPADM

## 2023-12-22 RX ORDER — SODIUM CHLORIDE 0.9 % (FLUSH) 0.9 %
5-40 SYRINGE (ML) INJECTION EVERY 12 HOURS SCHEDULED
Status: DISCONTINUED | OUTPATIENT
Start: 2023-12-22 | End: 2023-12-22 | Stop reason: HOSPADM

## 2023-12-22 RX ORDER — SODIUM CHLORIDE 9 MG/ML
INJECTION, SOLUTION INTRAVENOUS PRN
Status: DISCONTINUED | OUTPATIENT
Start: 2023-12-22 | End: 2023-12-22 | Stop reason: HOSPADM

## 2023-12-22 RX ORDER — BUPIVACAINE HYDROCHLORIDE 5 MG/ML
INJECTION, SOLUTION EPIDURAL; INTRACAUDAL
Status: COMPLETED | OUTPATIENT
Start: 2023-12-22 | End: 2023-12-22

## 2023-12-22 RX ORDER — ONDANSETRON 2 MG/ML
4 INJECTION INTRAMUSCULAR; INTRAVENOUS
Status: DISCONTINUED | OUTPATIENT
Start: 2023-12-22 | End: 2023-12-22 | Stop reason: HOSPADM

## 2023-12-22 RX ORDER — CYCLOBENZAPRINE HCL 5 MG
5 TABLET ORAL 3 TIMES DAILY PRN
Qty: 30 TABLET | Refills: 0 | Status: SHIPPED | OUTPATIENT
Start: 2023-12-22 | End: 2024-01-01

## 2023-12-22 RX ORDER — HYDROCODONE BITARTRATE AND ACETAMINOPHEN 5; 325 MG/1; MG/1
1 TABLET ORAL EVERY 6 HOURS PRN
Qty: 12 TABLET | Refills: 0 | Status: SHIPPED | OUTPATIENT
Start: 2023-12-22 | End: 2023-12-25

## 2023-12-22 RX ORDER — FENTANYL CITRATE 50 UG/ML
25 INJECTION, SOLUTION INTRAMUSCULAR; INTRAVENOUS EVERY 5 MIN PRN
Status: DISCONTINUED | OUTPATIENT
Start: 2023-12-22 | End: 2023-12-22 | Stop reason: HOSPADM

## 2023-12-22 RX ORDER — LABETALOL HYDROCHLORIDE 5 MG/ML
10 INJECTION, SOLUTION INTRAVENOUS
Status: DISCONTINUED | OUTPATIENT
Start: 2023-12-22 | End: 2023-12-22 | Stop reason: HOSPADM

## 2023-12-22 RX ORDER — FENTANYL CITRATE 50 UG/ML
50 INJECTION, SOLUTION INTRAMUSCULAR; INTRAVENOUS EVERY 5 MIN PRN
Status: DISCONTINUED | OUTPATIENT
Start: 2023-12-22 | End: 2023-12-22 | Stop reason: HOSPADM

## 2023-12-22 RX ORDER — SODIUM CHLORIDE, SODIUM LACTATE, POTASSIUM CHLORIDE, CALCIUM CHLORIDE 600; 310; 30; 20 MG/100ML; MG/100ML; MG/100ML; MG/100ML
INJECTION, SOLUTION INTRAVENOUS CONTINUOUS
Status: DISCONTINUED | OUTPATIENT
Start: 2023-12-22 | End: 2023-12-22 | Stop reason: HOSPADM

## 2023-12-22 NOTE — DISCHARGE INSTRUCTIONS
Discharge Instructions for Abdominal Hernia   Dr Zina Bui PA-C  [phone] ACTIVITY:      BATHING: OK to shower but no bath tub or submerging incision under water. You may shower beginning the second day after surgery. Wound:  Keep wound dry and clean. May shower as instructed above. Dressing:  If you have a dressing over your belly button: You may remove your surgical dressing 2 days after surgery before you shower. Your incision is covered with glue that will fall off on its own with time. DRIVING: No driving until off narcotic pain medications and walking comfortably    RETURN TO WORK: When cleared by your surgeon    WALKING:  As tolerated     STAIRS:  As tolerated    Diet    Some pain medicine can cause constipation . To avoid this problem:   Drink plenty of fluids. Eat foods high in fiber , such as:   Whole grain cereals and breads   Fruits and vegetables   Legumes (eg, beans, lentils)   Physical Activity    Less than 10 pounds until cleared by your surgeon  Ask the doctor when you can return to normal activities. Wear your abdominal binder as needed for comfort      In addition:   Ask the doctor when you will be able to return to work. Do not drive unless your doctor has given you permission to do so. Medications     Take your pain medications as instructed. Resume your home medications as instructed. Lifestyle Changes   You and your doctor will plan lifestyle changes that will aid in recovery. If you smoke, your doctor may recommend that you quit . Smoking can cause chronic cough , a risk factor for this condition. Prevention   To prevent hernias from recurring:   Maintain a healthy weight . Strengthen abdominal muscles. Avoid heavy lifting. Get treated for chronic conditions, like constipation, allergies, or chronic coughing. Follow-up   The doctor will monitor this condition. You may need more exams. Go to all of your appointments.

## 2023-12-22 NOTE — PROGRESS NOTES
0906 Received pt from pacu, report taken from [place], [address] [place] [address] Street. Pt alert and awake, has no c/o, given water and crackers, wife at bedside, call light in reach. Incision dressing clean, dry and intact. No drainage noted. 9434 iv removed, pt has no c/o, ready to get dressed for d/c. Wife helping pt get dressed for d/c.  5236 d/c instructions given to wife and pt. Dressing clean, dry and intact, no drainage noted. Wife to get car for d/c.  4090 pt d/c to home via w/c per vips. Pt stable at d/c.

## 2023-12-22 NOTE — OP NOTE
Procedure Note    Indications: Symptomatic umbilical hernia    Pre-operative Diagnosis: Umbilical hernia    Post-operative Diagnosis: Umbilical hernia    Procedure: Open Umbilical Herniorraphy with mesh, 2 cm    Surgeon: Peggy Beckett MD    First Assistant: Bronson Gould PA-C  The  Use of a first assistant was necessary for the proper positioning, prepping, and draping of the patient, as well as the safe and expeditious execution of the case and closure of skin and subcutaneous tissues. Anesthesia: General endotracheal anesthesia    ASA Class: 2    Estimated Blood Loss:  Minimal           Total IV Fluids: 500 ml           Specimens: none           Implants: none           Complications:  None; patient tolerated the procedure well. Procedure Details   The patient was seen in the Holding Room. The risks, benefits, complications, treatment options, and expected outcomes were discussed with the patient. The possibilities of reaction to medication, pulmonary aspiration, perforation of viscus, bleeding, recurrent infection, the need for additional procedures, failure to diagnose a condition, and creating a complication requiring transfusion or operation were discussed with the patient. The patient concurred with the proposed plan, giving informed consent. The site of surgery properly noted/marked. The patient was taken to Operating Room , identified as Grayce Chong and the procedure verified as Umbilical Herniorrhaphy. A Time Out was held and the above information confirmed. The patient was placed supine. After establishing general anesthesia, the abdomen was prepped and draped in standard fashion. 0.25% marcaine was used for local anesthetic. A periumbilical incision was created. Dissection was carried down to the hernia sac located above the fascia and was mobilized from surrounding structures. Intact fascia was identified circumferentially around the defect. The hernia defect was 2 cm.   The fascia Statement Selected

## 2023-12-22 NOTE — BRIEF OP NOTE
Brief Postoperative Note      Patient: Diana Perez  YOB: 1975  MRN: [id number]    Date of Procedure: 12/22/2023    Pre-Op Diagnosis Codes:     * Umbilical hernia without obstruction and without gangrene [K42.9]    Post-Op Diagnosis: Same       Procedure(s): HERNIA UMBILICAL REPAIR WITHOUT MESH    Surgeon(s): Haroon Cruz MD    Assistant:  First Assistant: Tera Davila  Physician Assistant: Angel Hargrove PA-C    Anesthesia: General    Estimated Blood Loss (mL): Minimal    Complications: None    Specimens:   * No specimens in log *    Implants:  * No implants in log *      Drains: * No LDAs found *    Findings: As above.        Electronically signed by Angel Hargrove PA-C on 12/22/2023 at 8:23 AM

## 2023-12-22 NOTE — PROGRESS NOTES
7487: pt arrived in PACU. Monitors applied and alarms on. Report from [facility] and Priscilla REYES. SCDs on pt and abd binder in place. Pt A&O x 4, denies pain at this time. 9474: abd dressing in place. Gauze dressing in place with Tegaderm in place. No drainage present. 1339: ice chips given, warm blankets applied. 0900: pt resting at this time. Pt denies pain.

## 2023-12-22 NOTE — PROGRESS NOTES
12/22/23 7170   Encounter Summary   Encounter Overview/Reason  Pre-Op   Service Provided For: Family   Referral/Consult From: [facility]   Support System Spouse   Last Encounter  12/22/23  (Pre op visit and prayers with family. [facility] [place], good support in place. No other needs at this time.   Family informed how to contact .)   Complexity of Encounter Low   Begin Time 0605   End Time  0610   Total Time Calculated 5 min   Spiritual/Emotional needs   Type Spiritual Support   Assessment/Intervention/Outcome   Assessment Calm;Coping   Intervention Active listening;Nurtured Hope;Prayer (assurance of)/[place];Sustaining Presence/Ministry of presence   Outcome Comfort;Expressed Gratitude   Plan and Referrals   Plan/Referrals Continue Support (comment)  (Family informed how to contact .)

## 2024-01-04 ENCOUNTER — OFFICE VISIT (OUTPATIENT)
Dept: SURGERY | Age: 49
End: 2024-01-04
Payer: COMMERCIAL

## 2024-01-04 VITALS
SYSTOLIC BLOOD PRESSURE: 140 MMHG | HEART RATE: 94 BPM | HEIGHT: 70 IN | DIASTOLIC BLOOD PRESSURE: 74 MMHG | BODY MASS INDEX: 27.93 KG/M2 | OXYGEN SATURATION: 95 % | WEIGHT: 195.1 LBS

## 2024-01-04 DIAGNOSIS — K42.9 UMBILICAL HERNIA WITHOUT OBSTRUCTION AND WITHOUT GANGRENE: Primary | ICD-10-CM

## 2024-01-04 PROCEDURE — 99212 OFFICE O/P EST SF 10 MIN: CPT | Performed by: PHYSICIAN ASSISTANT

## 2024-01-04 ASSESSMENT — ENCOUNTER SYMPTOMS
CONSTIPATION: 0
RECTAL PAIN: 0
CHOKING: 0
COLOR CHANGE: 0
BACK PAIN: 0
APNEA: 0
ANAL BLEEDING: 0
NAUSEA: 0
STRIDOR: 0
PHOTOPHOBIA: 0
EYE ITCHING: 0
ABDOMINAL PAIN: 0
EYE REDNESS: 0
VOMITING: 0
SORE THROAT: 0

## 2024-01-04 NOTE — PROGRESS NOTES
Chief Complaint   Patient presents with    Follow-up     1st F/U Open Umb Hernia Repair @ [facility] 12/22/23         SUBJECTIVE:  Patient presents today for his 1st post op visit following open umbilical hernia repair.  Pt reports that pain is none.  Pt is  tolerating a regular diet. BMs are WNL.    Incisions: min bruising and no discharge.     Review of Systems   Constitutional:  Negative for chills and fever.   HENT:  Negative for ear pain, mouth sores, sore throat and tinnitus.    Eyes:  Negative for photophobia, redness and itching.   Respiratory:  Negative for apnea, choking and stridor.    Cardiovascular:  Negative for chest pain and palpitations.   Gastrointestinal:  Negative for abdominal pain, anal bleeding, constipation, nausea, rectal pain and vomiting.   Endocrine: Negative for polydipsia.   Genitourinary:  Negative for enuresis, flank pain and hematuria.   Musculoskeletal:  Negative for back pain, joint swelling and myalgias.   Skin:  Negative for color change and pallor.   Allergic/Immunologic: Negative for environmental allergies.   Neurological:  Negative for syncope and speech difficulty.   Psychiatric/Behavioral:  Negative for confusion and hallucinations.          Past Surgical History:   Procedure Laterality Date    UMBILICAL HERNIA REPAIR N/A 12/22/2023    HERNIA UMBILICAL REPAIR performed by Rodger Frederick MD at [facility] OR    WISDOM TOOTH EXTRACTION       Past Medical History:   Diagnosis Date    Hyperlipidemia      Family History   Problem Relation Age of Onset    Hypertension Father     Diabetes Father     Heart Disease Father      Social History     Socioeconomic History    Marital status:      Spouse name: Not on file    Number of children: Not on file    Years of education: Not on file    Highest education level: Not on file   Occupational History    Not on file   Tobacco Use    Smoking status: Former    Smokeless tobacco: Current     Types: Snuff   Vaping Use    Vaping Use: Never used

## 2024-01-18 ENCOUNTER — SCHEDULED TELEPHONE ENCOUNTER (OUTPATIENT)
Dept: SURGERY | Age: 49
End: 2024-01-18
Payer: COMMERCIAL

## 2024-01-18 DIAGNOSIS — K42.9 UMBILICAL HERNIA WITHOUT OBSTRUCTION AND WITHOUT GANGRENE: Primary | ICD-10-CM

## 2024-01-18 PROCEDURE — 99441 PR PHYS/QHP TELEPHONE EVALUATION 5-10 MIN: CPT | Performed by: PHYSICIAN ASSISTANT

## 2024-01-30 NOTE — PROGRESS NOTES
Documentation:  I communicated with the patient and/or health care decision maker about how he is doing post-op.   Details of this discussion including any medical advice provided:    SUBJECTIVE:  Patient presents today via phone visit for his 2nd post op visit following umbilical hernia repair.  Pt reports that pain is none.  Pt is  tolerating a regular diet. BMs are WNL.  Incisions: well healed.     Increase activities as tolerated.     Total Time: minutes: 5-10 minutes    Arron Peña was evaluated through a synchronous (real-time) audio encounter. Patient identification was verified at the start of the visit. He (or guardian if applicable) is aware that this is a billable service, which includes applicable co-pays. This visit was conducted with the patient's (and/or legal guardian's) verbal consent. He has not had a related appointment within my department in the past 7 days or scheduled within the next 24 hours.   The patient was located at Home: [address]  [place] [address].  The provider was located at Facility (Appt Dept): [address]  [place] [address].    Note: not billable if this call serves to triage the patient into an appointment for the relevant concern  Arron Peña is a 48 y.o. male evaluated via telephone on 1/18/2024 for No chief complaint on file.  .        Kristin Diamond PA-C

## 2024-03-11 RX ORDER — ATORVASTATIN CALCIUM 20 MG/1
20 TABLET, FILM COATED ORAL DAILY
Qty: 90 TABLET | Refills: 1 | Status: SHIPPED | OUTPATIENT
Start: 2024-03-11

## 2024-03-15 ASSESSMENT — PATIENT HEALTH QUESTIONNAIRE - PHQ9
SUM OF ALL RESPONSES TO PHQ QUESTIONS 1-9: 0
SUM OF ALL RESPONSES TO PHQ QUESTIONS 1-9: 0
1. LITTLE INTEREST OR PLEASURE IN DOING THINGS: NOT AT ALL
2. FEELING DOWN, DEPRESSED OR HOPELESS: NOT AT ALL
SUM OF ALL RESPONSES TO PHQ QUESTIONS 1-9: 0
2. FEELING DOWN, DEPRESSED OR HOPELESS: NOT AT ALL
SUM OF ALL RESPONSES TO PHQ9 QUESTIONS 1 & 2: 0
1. LITTLE INTEREST OR PLEASURE IN DOING THINGS: NOT AT ALL
SUM OF ALL RESPONSES TO PHQ QUESTIONS 1-9: 0
SUM OF ALL RESPONSES TO PHQ9 QUESTIONS 1 & 2: 0

## 2024-03-18 ENCOUNTER — OFFICE VISIT (OUTPATIENT)
Dept: INTERNAL MEDICINE CLINIC | Age: 49
End: 2024-03-18
Payer: COMMERCIAL

## 2024-03-18 VITALS
BODY MASS INDEX: 27 KG/M2 | HEIGHT: 70 IN | RESPIRATION RATE: 18 BRPM | SYSTOLIC BLOOD PRESSURE: 124 MMHG | OXYGEN SATURATION: 95 % | DIASTOLIC BLOOD PRESSURE: 82 MMHG | HEART RATE: 84 BPM | WEIGHT: 188.6 LBS

## 2024-03-18 DIAGNOSIS — E78.00 ELEVATED LDL CHOLESTEROL LEVEL: ICD-10-CM

## 2024-03-18 DIAGNOSIS — Z09 S/P UMBILICAL HERNIA REPAIR, FOLLOW-UP EXAM: ICD-10-CM

## 2024-03-18 DIAGNOSIS — Z00.00 ENCOUNTER FOR PREVENTIVE HEALTH EXAMINATION: Primary | ICD-10-CM

## 2024-03-18 DIAGNOSIS — Z00.00 ENCOUNTER FOR PREVENTIVE HEALTH EXAMINATION: ICD-10-CM

## 2024-03-18 DIAGNOSIS — J30.2 SEASONAL ALLERGIC RHINITIS, UNSPECIFIED TRIGGER: ICD-10-CM

## 2024-03-18 DIAGNOSIS — E78.00 ELEVATED LDL CHOLESTEROL LEVEL: Primary | ICD-10-CM

## 2024-03-18 LAB
ALBUMIN SERPL-MCNC: 4.8 G/DL (ref 3.4–5)
ALBUMIN/GLOB SERPL: 1.9 {RATIO} (ref 1.1–2.2)
ALP SERPL-CCNC: 89 U/L (ref 40–129)
ALT SERPL-CCNC: 40 U/L (ref 10–40)
ANION GAP SERPL CALCULATED.3IONS-SCNC: 6 MMOL/L (ref 3–16)
AST SERPL-CCNC: 22 U/L (ref 15–37)
BASOPHILS # BLD: 0 K/UL (ref 0–0.2)
BASOPHILS NFR BLD: 0.6 %
BILIRUB SERPL-MCNC: 0.4 MG/DL (ref 0–1)
BUN SERPL-MCNC: 14 MG/DL (ref 7–20)
CALCIUM SERPL-MCNC: 9.4 MG/DL (ref 8.3–10.6)
CHLORIDE SERPL-SCNC: 107 MMOL/L (ref 99–110)
CHOLEST SERPL-MCNC: 148 MG/DL (ref 0–199)
CO2 SERPL-SCNC: 28 MMOL/L (ref 21–32)
CREAT SERPL-MCNC: 0.7 MG/DL (ref 0.9–1.3)
DEPRECATED RDW RBC AUTO: 12.9 % (ref 12.4–15.4)
EOSINOPHIL # BLD: 0.2 K/UL (ref 0–0.6)
EOSINOPHIL NFR BLD: 5 %
GFR SERPLBLD CREATININE-BSD FMLA CKD-EPI: >60 ML/MIN/{1.73_M2}
GLUCOSE SERPL-MCNC: 87 MG/DL (ref 70–99)
HCT VFR BLD AUTO: 45.4 % (ref 40.5–52.5)
HDLC SERPL-MCNC: 45 MG/DL (ref 40–60)
HGB BLD-MCNC: 15.5 G/DL (ref 13.5–17.5)
LDL CHOLESTEROL CALCULATED: 77 MG/DL
LYMPHOCYTES # BLD: 1.5 K/UL (ref 1–5.1)
LYMPHOCYTES NFR BLD: 32.5 %
MCH RBC QN AUTO: 31 PG (ref 26–34)
MCHC RBC AUTO-ENTMCNC: 34.2 G/DL (ref 31–36)
MCV RBC AUTO: 90.6 FL (ref 80–100)
MONOCYTES # BLD: 0.3 K/UL (ref 0–1.3)
MONOCYTES NFR BLD: 7.3 %
NEUTROPHILS # BLD: 2.6 K/UL (ref 1.7–7.7)
NEUTROPHILS NFR BLD: 54.6 %
PLATELET # BLD AUTO: 236 K/UL (ref 135–450)
PMV BLD AUTO: 8.6 FL (ref 5–10.5)
POTASSIUM SERPL-SCNC: 5 MMOL/L (ref 3.5–5.1)
PROT SERPL-MCNC: 7.3 G/DL (ref 6.4–8.2)
RBC # BLD AUTO: 5.01 M/UL (ref 4.2–5.9)
SODIUM SERPL-SCNC: 141 MMOL/L (ref 136–145)
TRIGL SERPL-MCNC: 129 MG/DL (ref 0–150)
VLDLC SERPL CALC-MCNC: 26 MG/DL
WBC # BLD AUTO: 4.7 K/UL (ref 4–11)

## 2024-03-18 PROCEDURE — 99396 PREV VISIT EST AGE 40-64: CPT | Performed by: NURSE PRACTITIONER

## 2024-03-18 PROCEDURE — 36415 COLL VENOUS BLD VENIPUNCTURE: CPT | Performed by: NURSE PRACTITIONER

## 2024-03-18 ASSESSMENT — ENCOUNTER SYMPTOMS
DIARRHEA: 0
CHEST TIGHTNESS: 0
COLOR CHANGE: 0
SINUS PAIN: 0
WHEEZING: 0
ABDOMINAL DISTENTION: 0
CONSTIPATION: 0
EYES NEGATIVE: 1
ABDOMINAL PAIN: 0
SORE THROAT: 0
SINUS PRESSURE: 0

## 2024-03-18 NOTE — PROGRESS NOTES
g/dL 16.3  15.2  15.3        Lab Results   Component Value Date/Time    CHOL 242 03/13/2019 07:15 AM    CHOL 237 03/16/2018 07:19 AM    CHOL 246 04/01/2017 07:25 AM    CHOLFAST 187 03/28/2023 09:06 AM    CHOLFAST 252 03/28/2022 08:51 AM    CHOLFAST 201 03/26/2021 09:14 AM    TRIG 172 03/13/2019 07:15 AM    TRIG 233 03/16/2018 07:19 AM    TRIG 138 04/01/2017 07:25 AM    TRIGLYCFAST 105 03/28/2023 09:06 AM    TRIGLYCFAST 232 03/28/2022 08:51 AM    TRIGLYCFAST 124 03/26/2021 09:14 AM    HDL 47 03/28/2023 09:06 AM    HDL 44 03/28/2022 08:51 AM    HDL 37 03/26/2021 09:14 AM    LDLCALC 119 03/28/2023 09:06 AM    LDLCALC 162 03/28/2022 08:51 AM    LDLCALC 139 03/26/2021 09:14 AM    GLUCOSE 92 03/28/2023 09:06 AM    LABA1C 5.4 03/28/2023 09:06 AM       The 10-year ASCVD risk score (Christin WINN, et al., 2019) is: 2.6%    Values used to calculate the score:      Age: 48 years      Sex: Male      Is Non- : No      Diabetic: No      Tobacco smoker: No      Systolic Blood Pressure: 124 mmHg      Is BP treated: No      HDL Cholesterol: 47 mg/dL      Total Cholesterol: 187 mg/dL    Immunization History   Administered Date(s) Administered    COVID-19, J&J, (age 18y+), IM, 0.5 mL 03/29/2021    TDaP, ADACEL (age 10y-64y), BOOSTRIX (age 10y+), IM, 0.5mL 09/24/2019       Health Maintenance   Topic Date Due    Hepatitis B vaccine (1 of 3 - 3-dose series) Never done    Flu vaccine (1) 08/01/2023    COVID-19 Vaccine (2 - 2023-24 season) 09/01/2023    Lipids  03/28/2024    Depression Screen  03/15/2025    Colorectal Cancer Screen  04/10/2025    DTaP/Tdap/Td vaccine (2 - Td or Tdap) 09/24/2029    HIV screen  Completed    Hepatitis A vaccine  Aged Out    Hib vaccine  Aged Out    Polio vaccine  Aged Out    Meningococcal (ACWY) vaccine  Aged Out    Pneumococcal 0-64 years Vaccine  Aged Out    Hepatitis C screen  Discontinued       Assessment & Plan   Encounter for preventive health examination - no acute concerns on exam;

## 2024-06-04 ENCOUNTER — TELEMEDICINE (OUTPATIENT)
Dept: INTERNAL MEDICINE CLINIC | Age: 49
End: 2024-06-04
Payer: COMMERCIAL

## 2024-06-04 DIAGNOSIS — J40 BRONCHITIS: Primary | ICD-10-CM

## 2024-06-04 PROCEDURE — 99213 OFFICE O/P EST LOW 20 MIN: CPT | Performed by: NURSE PRACTITIONER

## 2024-06-04 RX ORDER — PREDNISONE 10 MG/1
TABLET ORAL
Qty: 20 TABLET | Refills: 0 | Status: SHIPPED | OUTPATIENT
Start: 2024-06-04

## 2024-06-04 RX ORDER — AZITHROMYCIN 250 MG/1
TABLET, FILM COATED ORAL
Qty: 6 TABLET | Refills: 0 | Status: SHIPPED | OUTPATIENT
Start: 2024-06-04 | End: 2024-06-14

## 2024-06-04 SDOH — ECONOMIC STABILITY: FOOD INSECURITY: WITHIN THE PAST 12 MONTHS, THE FOOD YOU BOUGHT JUST DIDN'T LAST AND YOU DIDN'T HAVE MONEY TO GET MORE.: NEVER TRUE

## 2024-06-04 SDOH — ECONOMIC STABILITY: FOOD INSECURITY: WITHIN THE PAST 12 MONTHS, YOU WORRIED THAT YOUR FOOD WOULD RUN OUT BEFORE YOU GOT MONEY TO BUY MORE.: NEVER TRUE

## 2024-06-04 SDOH — ECONOMIC STABILITY: INCOME INSECURITY: HOW HARD IS IT FOR YOU TO PAY FOR THE VERY BASICS LIKE FOOD, HOUSING, MEDICAL CARE, AND HEATING?: NOT HARD AT ALL

## 2024-06-04 ASSESSMENT — ENCOUNTER SYMPTOMS
SINUS PRESSURE: 0
WHEEZING: 0
SINUS PAIN: 0
CHEST TIGHTNESS: 0
SORE THROAT: 0
SHORTNESS OF BREATH: 0
RHINORRHEA: 1
NAUSEA: 0
ABDOMINAL DISTENTION: 0
EYES NEGATIVE: 1
COUGH: 1
DIARRHEA: 0
CONSTIPATION: 0
ABDOMINAL PAIN: 0
COLOR CHANGE: 0

## 2024-06-04 NOTE — PROGRESS NOTES
2024    TELEHEALTH EVALUATION -- Audio/Visual    HPI:    Arron Peña (:  1975) has requested an audio/video evaluation for the following concern(s):    Mr Peña presents to the office this morning for c/o recent issues with persistent cough and chest congestion which has been ongoing over the last week, productive of clear/yellow mucus. Denies any wheezing/SOB, fevers, or chills. Has been taking nyquil, mucinex, dayquil with modest benefit.     Review of Systems   Constitutional:  Negative for activity change, appetite change, fatigue and fever.   HENT:  Positive for congestion, postnasal drip and rhinorrhea. Negative for sinus pressure, sinus pain and sore throat.    Eyes: Negative.    Respiratory:  Positive for cough. Negative for chest tightness, shortness of breath and wheezing.    Cardiovascular:  Negative for chest pain and palpitations.   Gastrointestinal:  Negative for abdominal distention, abdominal pain, constipation, diarrhea and nausea.   Genitourinary:  Negative for difficulty urinating, dysuria, flank pain, frequency and hematuria.   Musculoskeletal:  Negative for arthralgias, gait problem, joint swelling and myalgias.   Skin:  Negative for color change and rash.   Neurological:  Negative for dizziness, light-headedness and numbness.   Hematological: Negative.    Psychiatric/Behavioral: Negative.         Prior to Visit Medications    Medication Sig Taking? Authorizing Provider   azithromycin (ZITHROMAX) 250 MG tablet 500mg on day 1 followed by 250mg on days 2 - 5 Yes Fede Delaney APRN - CNP   predniSONE (DELTASONE) 10 MG tablet Take 4 tablets daily for 2 days, then 3 tablets for 2 days, 2 tablets for 2 days, then 1 tablet for 2 days Yes Fede Delaney APRN - CNP   NONFORMULARY Semi glutide Yes Jasbir Haynes MD   cetirizine (ZYRTEC) 10 MG tablet Take 1 tablet by mouth daily Yes Jasbir Haynes MD       Social History     Tobacco Use    Smoking status: Former

## 2024-09-03 ENCOUNTER — HOSPITAL ENCOUNTER (OUTPATIENT)
Age: 49
Discharge: HOME | End: 2024-09-03
Payer: COMMERCIAL

## 2024-09-03 ENCOUNTER — OFFICE VISIT (OUTPATIENT)
Dept: INTERNAL MEDICINE CLINIC | Age: 49
End: 2024-09-03
Payer: COMMERCIAL

## 2024-09-03 VITALS
HEART RATE: 74 BPM | OXYGEN SATURATION: 98 % | SYSTOLIC BLOOD PRESSURE: 126 MMHG | HEIGHT: 70 IN | DIASTOLIC BLOOD PRESSURE: 72 MMHG | RESPIRATION RATE: 16 BRPM | BODY MASS INDEX: 25.62 KG/M2 | WEIGHT: 179 LBS

## 2024-09-03 DIAGNOSIS — E78.00 ELEVATED LDL CHOLESTEROL LEVEL: ICD-10-CM

## 2024-09-03 DIAGNOSIS — E78.00 ELEVATED LDL CHOLESTEROL LEVEL: Primary | ICD-10-CM

## 2024-09-03 DIAGNOSIS — J30.2 SEASONAL ALLERGIC RHINITIS, UNSPECIFIED TRIGGER: ICD-10-CM

## 2024-09-03 LAB
ALBUMIN SERPL-MCNC: 4.6 GM/DL (ref 3.4–5)
ALP BLD-CCNC: 87 IU/L (ref 40–128)
ALT SERPL-CCNC: 26 U/L (ref 10–40)
ANION GAP SERPL CALCULATED.3IONS-SCNC: 13 MMOL/L (ref 7–16)
AST SERPL-CCNC: 25 IU/L (ref 15–37)
BILIRUB SERPL-MCNC: 0.5 MG/DL (ref 0–1)
BUN SERPL-MCNC: 10 MG/DL (ref 6–23)
CALCIUM SERPL-MCNC: 9 MG/DL (ref 8.3–10.6)
CHLORIDE BLD-SCNC: 104 MMOL/L (ref 99–110)
CHOLESTEROL, FASTING: 226 MG/DL
CO2: 24 MMOL/L (ref 21–32)
CREAT SERPL-MCNC: 0.6 MG/DL (ref 0.9–1.3)
GFR, ESTIMATED: >90 ML/MIN/1.73M2
GLUCOSE SERPL-MCNC: 88 MG/DL (ref 70–99)
HDLC SERPL-MCNC: 59 MG/DL
LDLC SERPL CALC-MCNC: 150 MG/DL
POTASSIUM SERPL-SCNC: 4.1 MMOL/L (ref 3.5–5.1)
SODIUM BLD-SCNC: 141 MMOL/L (ref 135–145)
TOTAL PROTEIN: 7.9 GM/DL (ref 6.4–8.2)
TRIGLYCERIDE, FASTING: 87 MG/DL

## 2024-09-03 PROCEDURE — 80061 LIPID PANEL: CPT

## 2024-09-03 PROCEDURE — 99214 OFFICE O/P EST MOD 30 MIN: CPT | Performed by: NURSE PRACTITIONER

## 2024-09-03 PROCEDURE — 80053 COMPREHEN METABOLIC PANEL: CPT

## 2024-09-03 PROCEDURE — 36415 COLL VENOUS BLD VENIPUNCTURE: CPT

## 2024-09-03 PROCEDURE — G2211 COMPLEX E/M VISIT ADD ON: HCPCS | Performed by: NURSE PRACTITIONER

## 2024-09-03 ASSESSMENT — ENCOUNTER SYMPTOMS
NAUSEA: 0
EYES NEGATIVE: 1
SHORTNESS OF BREATH: 0
CHEST TIGHTNESS: 0
SINUS PRESSURE: 0
COUGH: 0
SINUS PAIN: 0
SORE THROAT: 0
ABDOMINAL DISTENTION: 0
COLOR CHANGE: 0
DIARRHEA: 0
ABDOMINAL PAIN: 0
CONSTIPATION: 0
WHEEZING: 0

## 2024-09-03 NOTE — PROGRESS NOTES
Arron Peña  1975 09/03/24    Chief Complaint   Patient presents with    6 Month Follow-Up       SUBJECTIVE:      6 month follow up:    Continues to hold Lipitor and has been doing so for approximately 6 months. Needs lipids rechecked at this time.     AR symptoms controlled with Zyrtec. No recent changes or concerns.      Review of Systems   Constitutional:  Negative for activity change, appetite change, fatigue and fever.   HENT:  Negative for congestion, sinus pressure, sinus pain and sore throat.    Eyes: Negative.    Respiratory:  Negative for cough, chest tightness, shortness of breath and wheezing.    Cardiovascular:  Negative for chest pain and palpitations.   Gastrointestinal:  Negative for abdominal distention, abdominal pain, constipation, diarrhea and nausea.   Genitourinary:  Negative for difficulty urinating, dysuria, flank pain, frequency and hematuria.   Musculoskeletal:  Negative for arthralgias, gait problem, joint swelling and myalgias.   Skin:  Negative for color change and rash.   Neurological:  Negative for dizziness, light-headedness and numbness.   Hematological: Negative.    Psychiatric/Behavioral: Negative.         OBJECTIVE:    /72   Pulse 74   Resp 16   Ht 1.778 m (5' 10\")   Wt 81.2 kg (179 lb)   SpO2 98%   BMI 25.68 kg/m²     Physical Exam  Constitutional:       General: He is not in acute distress.     Appearance: He is well-developed. He is not diaphoretic.   HENT:      Head: Normocephalic and atraumatic.      Nose: Nose normal.   Eyes:      Conjunctiva/sclera: Conjunctivae normal.      Pupils: Pupils are equal, round, and reactive to light.   Neck:      Thyroid: No thyromegaly.   Cardiovascular:      Rate and Rhythm: Normal rate and regular rhythm.      Heart sounds: Normal heart sounds. No murmur heard.  Pulmonary:      Effort: Pulmonary effort is normal.      Breath sounds: Normal breath sounds.   Abdominal:      General: Bowel sounds are normal. There is no

## 2025-01-29 ENCOUNTER — TELEMEDICINE (OUTPATIENT)
Dept: INTERNAL MEDICINE CLINIC | Age: 50
End: 2025-01-29

## 2025-01-29 DIAGNOSIS — J40 BRONCHITIS: Primary | ICD-10-CM

## 2025-01-29 RX ORDER — PREDNISONE 10 MG/1
TABLET ORAL
Qty: 20 TABLET | Refills: 0 | Status: SHIPPED | OUTPATIENT
Start: 2025-01-29

## 2025-01-29 RX ORDER — AZITHROMYCIN 250 MG/1
TABLET, FILM COATED ORAL
Qty: 6 TABLET | Refills: 0 | Status: SHIPPED | OUTPATIENT
Start: 2025-01-29 | End: 2025-02-08

## 2025-01-29 SDOH — ECONOMIC STABILITY: FOOD INSECURITY: WITHIN THE PAST 12 MONTHS, THE FOOD YOU BOUGHT JUST DIDN'T LAST AND YOU DIDN'T HAVE MONEY TO GET MORE.: NEVER TRUE

## 2025-01-29 SDOH — ECONOMIC STABILITY: FOOD INSECURITY: WITHIN THE PAST 12 MONTHS, YOU WORRIED THAT YOUR FOOD WOULD RUN OUT BEFORE YOU GOT MONEY TO BUY MORE.: NEVER TRUE

## 2025-01-29 ASSESSMENT — PATIENT HEALTH QUESTIONNAIRE - PHQ9
2. FEELING DOWN, DEPRESSED OR HOPELESS: NOT AT ALL
SUM OF ALL RESPONSES TO PHQ QUESTIONS 1-9: 0
SUM OF ALL RESPONSES TO PHQ QUESTIONS 1-9: 0
DEPRESSION UNABLE TO ASSESS: FUNCTIONAL CAPACITY MOTIVATION LIMITS ACCURACY
SUM OF ALL RESPONSES TO PHQ9 QUESTIONS 1 & 2: 0
1. LITTLE INTEREST OR PLEASURE IN DOING THINGS: NOT AT ALL
SUM OF ALL RESPONSES TO PHQ QUESTIONS 1-9: 0
SUM OF ALL RESPONSES TO PHQ QUESTIONS 1-9: 0

## 2025-01-29 NOTE — PROGRESS NOTES
Documentation:  I communicated with the patient and/or health care decision maker about:    Mart presents via phone visit this morning for c/o issues with worsening cough, chest congestion, burning in his chest, and mild pharyngitis over the last week. Cough is intermittently productive of thick yellow mucus. Home COVID testing was negative. Has been working outside in the cold frequently in the last few days. Denies any wheezing or SOB. Has been taking Mucinex and Annalise-[place] cough/cold with modest benefit.     Details of this discussion including any medical advice provided:     PLAN: exam limited virtually, however, presentation most c/w likely bronchitis. Given duration of symptoms, consider bacterial etiology. Will cover with azith, prednisone taper. Be seen in person if no improvement in 1 week.     Total Time: minutes: 11-20 minutes    Arron Peña was evaluated through a synchronous (real-time) audio encounter. Patient identification was verified at the start of the visit. He (or guardian if applicable) is aware that this is a billable service, which includes applicable co-pays. This visit was conducted with the patient's (and/or legal guardian's) verbal consent. He has not had a related appointment within my department in the past 7 days or scheduled within the next 24 hours.   The patient was located at Home: [address]  [place] [address].  The provider was located at Facility (Appt Dept): [address] [place] [address].  Confirm you are appropriately licensed, registered, or certified to deliver care in the state where the patient is located as indicated above. If you are not or unsure, please re-schedule the visit: Yes, I confirm.     Note: not billable if this call serves to triage the patient into an appointment for the relevant concern    Arron Peña is a 49 y.o. male evaluated via telephone on 1/29/2025 for Cough (Chest congestion, sore throat sx started 2 days ago.

## 2025-02-28 SDOH — ECONOMIC STABILITY: INCOME INSECURITY: IN THE LAST 12 MONTHS, WAS THERE A TIME WHEN YOU WERE NOT ABLE TO PAY THE MORTGAGE OR RENT ON TIME?: NO

## 2025-02-28 SDOH — ECONOMIC STABILITY: TRANSPORTATION INSECURITY
IN THE PAST 12 MONTHS, HAS THE LACK OF TRANSPORTATION KEPT YOU FROM MEDICAL APPOINTMENTS OR FROM GETTING MEDICATIONS?: NO

## 2025-02-28 SDOH — ECONOMIC STABILITY: FOOD INSECURITY: WITHIN THE PAST 12 MONTHS, YOU WORRIED THAT YOUR FOOD WOULD RUN OUT BEFORE YOU GOT MONEY TO BUY MORE.: NEVER TRUE

## 2025-02-28 SDOH — ECONOMIC STABILITY: FOOD INSECURITY: WITHIN THE PAST 12 MONTHS, THE FOOD YOU BOUGHT JUST DIDN'T LAST AND YOU DIDN'T HAVE MONEY TO GET MORE.: NEVER TRUE

## 2025-03-03 ENCOUNTER — OFFICE VISIT (OUTPATIENT)
Dept: INTERNAL MEDICINE CLINIC | Age: 50
End: 2025-03-03
Payer: COMMERCIAL

## 2025-03-03 ENCOUNTER — HOSPITAL ENCOUNTER (OUTPATIENT)
Age: 50
Discharge: HOME | End: 2025-03-03
Payer: COMMERCIAL

## 2025-03-03 VITALS
BODY MASS INDEX: 26.48 KG/M2 | RESPIRATION RATE: 18 BRPM | SYSTOLIC BLOOD PRESSURE: 120 MMHG | HEIGHT: 70 IN | HEART RATE: 73 BPM | WEIGHT: 185 LBS | DIASTOLIC BLOOD PRESSURE: 84 MMHG | OXYGEN SATURATION: 93 %

## 2025-03-03 DIAGNOSIS — E78.00 ELEVATED LDL CHOLESTEROL LEVEL: ICD-10-CM

## 2025-03-03 DIAGNOSIS — J30.2 SEASONAL ALLERGIC RHINITIS, UNSPECIFIED TRIGGER: ICD-10-CM

## 2025-03-03 DIAGNOSIS — E78.00 ELEVATED LDL CHOLESTEROL LEVEL: Primary | ICD-10-CM

## 2025-03-03 DIAGNOSIS — Z00.00 ENCOUNTER FOR PREVENTIVE HEALTH EXAMINATION: ICD-10-CM

## 2025-03-03 DIAGNOSIS — Z00.00 ENCOUNTER FOR PREVENTIVE HEALTH EXAMINATION: Primary | ICD-10-CM

## 2025-03-03 DIAGNOSIS — Z12.11 SCREENING FOR MALIGNANT NEOPLASM OF COLON: ICD-10-CM

## 2025-03-03 LAB
ALBUMIN SERPL-MCNC: 4.5 G/DL (ref 3.4–5)
ALBUMIN/GLOB SERPL: 1.6 {RATIO} (ref 1.1–2.2)
ALP SERPL-CCNC: 74 U/L (ref 40–129)
ALT SERPL-CCNC: 31 U/L (ref 10–40)
ANION GAP SERPL CALCULATED.3IONS-SCNC: 11 MMOL/L (ref 9–17)
AST SERPL-CCNC: 25 U/L (ref 15–37)
BILIRUB SERPL-MCNC: 0.6 MG/DL (ref 0–1)
BUN SERPL-MCNC: 11 MG/DL (ref 7–20)
CALCIUM SERPL-MCNC: 9.3 MG/DL (ref 8.3–10.6)
CHLORIDE SERPL-SCNC: 105 MMOL/L (ref 99–110)
CHOLEST SERPL-MCNC: 240 MG/DL (ref 125–199)
CO2 SERPL-SCNC: 24 MMOL/L (ref 21–32)
CREAT SERPL-MCNC: 0.7 MG/DL (ref 0.9–1.3)
GFR, ESTIMATED: >90 ML/MIN/1.73M2
GLUCOSE SERPL-MCNC: 88 MG/DL (ref 74–99)
HDLC SERPL-MCNC: 53 MG/DL
LDLC SERPL CALC-MCNC: 164 MG/DL
POTASSIUM SERPL-SCNC: 4.1 MMOL/L (ref 3.5–5.1)
PROT SERPL-MCNC: 7.3 G/DL (ref 6.4–8.2)
SODIUM SERPL-SCNC: 139 MMOL/L (ref 136–145)
TRIGL SERPL-MCNC: 116 MG/DL

## 2025-03-03 PROCEDURE — 80061 LIPID PANEL: CPT

## 2025-03-03 PROCEDURE — 80053 COMPREHEN METABOLIC PANEL: CPT

## 2025-03-03 PROCEDURE — 99396 PREV VISIT EST AGE 40-64: CPT | Performed by: NURSE PRACTITIONER

## 2025-03-03 SDOH — ECONOMIC STABILITY: FOOD INSECURITY: WITHIN THE PAST 12 MONTHS, YOU WORRIED THAT YOUR FOOD WOULD RUN OUT BEFORE YOU GOT MONEY TO BUY MORE.: NEVER TRUE

## 2025-03-03 SDOH — ECONOMIC STABILITY: FOOD INSECURITY: WITHIN THE PAST 12 MONTHS, THE FOOD YOU BOUGHT JUST DIDN'T LAST AND YOU DIDN'T HAVE MONEY TO GET MORE.: NEVER TRUE

## 2025-03-03 ASSESSMENT — ENCOUNTER SYMPTOMS
SHORTNESS OF BREATH: 0
EYES NEGATIVE: 1
SORE THROAT: 0
COUGH: 0
CHEST TIGHTNESS: 0
COLOR CHANGE: 0
CONSTIPATION: 0
ABDOMINAL PAIN: 0
DIARRHEA: 0
WHEEZING: 0
SINUS PAIN: 0
SINUS PRESSURE: 0
ABDOMINAL DISTENTION: 0
NAUSEA: 0

## 2025-03-03 NOTE — PROGRESS NOTES
Arron Peña  1975 03/03/25    Chief Complaint   Patient presents with    6 Month Follow-Up       SUBJECTIVE:      6 month follow up:    Remains on Lipitor for     Review of Systems    OBJECTIVE:    /84   Pulse 73   Resp 18   Ht 1.778 m (5' 10\")   Wt 83.9 kg (185 lb)   SpO2 93%   BMI 26.54 kg/m²     Physical Exam    ASSESSMENT:    No diagnosis found.    PLAN:    There are no diagnoses linked to this encounter.         No data to display                No follow-ups on file.    Julio César note this reports has been produced speech recognition software and may contain errors related to that system including errors in grammar, punctuation, and spelling, as well as words and phrases that may be appropriate. If there are any questions or concerns please feel free to contact the dictating provider for clarification.   
Completed    Hepatitis A vaccine  Aged Out    Hib vaccine  Aged Out    Polio vaccine  Aged Out    Meningococcal (ACWY) vaccine  Aged Out    Hepatitis C screen  Discontinued           Assessment & Plan  Encounter for preventive health examination            Elevated LDL cholesterol level            Seasonal allergic rhinitis, unspecified trigger          Course of treatment, including any medications, possible imaging, referrals, and follow ups discussed with patient. All risks and benefits and possible side effects discussed with patient who agrees to plan of care and verbalizes understanding. All labs and imaging reviewed.       Return in about 6 months (around 9/3/2025).           --ARACELIS Etienne - CNP

## 2025-03-03 NOTE — ASSESSMENT & PLAN NOTE
Course of treatment, including any medications, possible imaging, referrals, and follow ups discussed with patient. All risks and benefits and possible side effects discussed with patient who agrees to plan of care and verbalizes understanding. All labs and imaging reviewed.

## 2025-03-16 LAB — NONINV COLON CA DNA+OCC BLD SCRN STL QL: NEGATIVE

## 2025-03-17 ENCOUNTER — RESULTS FOLLOW-UP (OUTPATIENT)
Dept: INTERNAL MEDICINE CLINIC | Age: 50
End: 2025-03-17

## 2025-09-04 ENCOUNTER — HOSPITAL ENCOUNTER (OUTPATIENT)
Dept: LAB | Age: 50
Discharge: HOME | End: 2025-09-04
Payer: COMMERCIAL

## 2025-09-04 ENCOUNTER — OFFICE VISIT (OUTPATIENT)
Dept: INTERNAL MEDICINE CLINIC | Age: 50
End: 2025-09-04
Payer: COMMERCIAL

## 2025-09-04 VITALS
BODY MASS INDEX: 27.35 KG/M2 | WEIGHT: 191 LBS | RESPIRATION RATE: 20 BRPM | SYSTOLIC BLOOD PRESSURE: 128 MMHG | HEART RATE: 71 BPM | DIASTOLIC BLOOD PRESSURE: 84 MMHG | OXYGEN SATURATION: 95 % | HEIGHT: 70 IN

## 2025-09-04 DIAGNOSIS — E78.00 ELEVATED LDL CHOLESTEROL LEVEL: ICD-10-CM

## 2025-09-04 DIAGNOSIS — Z00.00 ENCOUNTER FOR PREVENTIVE HEALTH EXAMINATION: Primary | ICD-10-CM

## 2025-09-04 DIAGNOSIS — J30.2 SEASONAL ALLERGIC RHINITIS, UNSPECIFIED TRIGGER: ICD-10-CM

## 2025-09-04 DIAGNOSIS — Z12.5 SCREENING FOR MALIGNANT NEOPLASM OF PROSTATE: ICD-10-CM

## 2025-09-04 DIAGNOSIS — Z00.00 ENCOUNTER FOR PREVENTIVE HEALTH EXAMINATION: ICD-10-CM

## 2025-09-04 LAB
ALBUMIN SERPL-MCNC: 4.5 G/DL (ref 3.4–5)
ALBUMIN/GLOB SERPL: 1.6 {RATIO} (ref 1.1–2.2)
ALP SERPL-CCNC: 78 U/L (ref 40–129)
ALT SERPL-CCNC: 32 U/L (ref 10–40)
ANION GAP SERPL CALCULATED.3IONS-SCNC: 11 MMOL/L (ref 9–17)
AST SERPL-CCNC: 26 U/L (ref 15–37)
BILIRUB SERPL-MCNC: 0.3 MG/DL (ref 0–1)
BUN SERPL-MCNC: 18 MG/DL (ref 7–20)
CALCIUM SERPL-MCNC: 9.3 MG/DL (ref 8.3–10.6)
CHLORIDE SERPL-SCNC: 104 MMOL/L (ref 99–110)
CHOLEST SERPL-MCNC: 253 MG/DL (ref 125–199)
CO2 SERPL-SCNC: 24 MMOL/L (ref 21–32)
CREAT SERPL-MCNC: 0.7 MG/DL (ref 0.9–1.3)
GFR, ESTIMATED: >90 ML/MIN/1.73M2
GLUCOSE SERPL-MCNC: 89 MG/DL (ref 74–99)
HDLC SERPL-MCNC: 59 MG/DL
LDLC SERPL CALC-MCNC: 174 MG/DL
POTASSIUM SERPL-SCNC: 4.2 MMOL/L (ref 3.5–5.1)
PROT SERPL-MCNC: 7.4 G/DL (ref 6.4–8.2)
PSA SERPL-MCNC: 0.59 NG/ML (ref 0–4)
SODIUM SERPL-SCNC: 138 MMOL/L (ref 136–145)
TRIGL SERPL-MCNC: 98 MG/DL

## 2025-09-04 PROCEDURE — 99396 PREV VISIT EST AGE 40-64: CPT | Performed by: NURSE PRACTITIONER

## 2025-09-04 PROCEDURE — 80061 LIPID PANEL: CPT

## 2025-09-04 PROCEDURE — 80053 COMPREHEN METABOLIC PANEL: CPT

## 2025-09-04 PROCEDURE — G0103 PSA SCREENING: HCPCS

## 2025-09-04 ASSESSMENT — ENCOUNTER SYMPTOMS
NAUSEA: 0
EYES NEGATIVE: 1
DIARRHEA: 0
COLOR CHANGE: 0
SINUS PAIN: 0
COUGH: 0
SORE THROAT: 0
ABDOMINAL PAIN: 0
ABDOMINAL DISTENTION: 0
SINUS PRESSURE: 0
CHEST TIGHTNESS: 0
CONSTIPATION: 0
WHEEZING: 0
SHORTNESS OF BREATH: 0

## 2025-09-05 DIAGNOSIS — E78.00 ELEVATED LDL CHOLESTEROL LEVEL: Primary | ICD-10-CM

## (undated) DEVICE — LIQUIBAND RAPID ADHESIVE 36/CS 0.8ML: Brand: MEDLINE

## (undated) DEVICE — SUTURE VCRL SZ 3-0 L27IN ABSRB UD L26MM SH 1/2 CIR J416H

## (undated) DEVICE — GLOVE ORANGE PI 7 1/2   MSG9075

## (undated) DEVICE — SYRINGE MED 20ML STD CLR PLAS LUERLOCK TIP N CTRL DISP

## (undated) DEVICE — TUBING, SUCTION, 9/32" X 10', STRAIGHT: Brand: MEDLINE

## (undated) DEVICE — INTENDED FOR TISSUE SEPARATION, AND OTHER PROCEDURES THAT REQUIRE A SHARP SURGICAL BLADE TO PUNCTURE OR CUT.: Brand: BARD-PARKER ® STAINLESS STEEL BLADES

## (undated) DEVICE — SHEET, T, LAPAROTOMY, STERILE: Brand: MEDLINE

## (undated) DEVICE — MARKER SURG SKIN UTIL REGULAR/FINE 2 TIP W/ RUL AND 9 LBL

## (undated) DEVICE — YANKAUER,FLEXIBLE HANDLE,REGLR CAPACITY: Brand: MEDLINE INDUSTRIES, INC.

## (undated) DEVICE — NEEDLE HYPO 20GA L1.5IN YEL POLYPR HUB S STL REG BVL STR

## (undated) DEVICE — DRESSING TRNSPAR W4XL10IN FLM MIC POR SURESITE 123

## (undated) DEVICE — SPONGE,TONSIL,DBL STRNG,XRAY,SM,7/8",ST: Brand: MEDLINE INDUSTRIES, INC.

## (undated) DEVICE — COUNTER NDL 30 COUNT FOAM STRP SGL MAG

## (undated) DEVICE — ELECTRODE ES AD CRDLSS PT RET REM POLYHESIVE

## (undated) DEVICE — SOLUTION IV IRRIG WATER 1000ML POUR BRL 2F7114

## (undated) DEVICE — GLOVE SURG SZ 6 THK91MIL LTX FREE SYN POLYISOPRENE ANTI

## (undated) DEVICE — TOWEL,OR,DSP,ST,BLUE,STD,6/PK,12PK/CS: Brand: MEDLINE

## (undated) DEVICE — SUTURE PDS II SZ 2-0 L27IN ABSRB VLT L26MM CT-2 1/2 CIR Z333H

## (undated) DEVICE — SUTURE VCRL SZ 4-0 L18IN ABSRB UD L19MM PS-2 3/8 CIR PRIM J496H

## (undated) DEVICE — GOWN,SIRUS,POLYRNF,BRTHSLV,XLN/XL,20/CS: Brand: MEDLINE

## (undated) DEVICE — Z INACTIVE NO ACTIVE SUPPLIER APPLICATOR MEDICATED 26 CC TINT HI-LITE ORNG STRL CHLORAPREP

## (undated) DEVICE — PENCIL ES CRD L10FT HND SWCHING ROCK SWCH W/ EDGE COAT BLDE

## (undated) DEVICE — BINDER ABD UNISX 9IN 62IN L AND XL UNIV

## (undated) DEVICE — PACK,BASIC,IX: Brand: MEDLINE

## (undated) DEVICE — Z INACTIVE USE 2863041 SPONGE GZ W4XL4IN 100% COT 16 PLY RADPQ HIGHLY ABSRB